# Patient Record
Sex: MALE | Race: WHITE | NOT HISPANIC OR LATINO | Employment: OTHER | URBAN - METROPOLITAN AREA
[De-identification: names, ages, dates, MRNs, and addresses within clinical notes are randomized per-mention and may not be internally consistent; named-entity substitution may affect disease eponyms.]

---

## 2017-01-03 ENCOUNTER — MYC MEDICAL ADVICE (OUTPATIENT)
Dept: INTERNAL MEDICINE | Facility: CLINIC | Age: 72
End: 2017-01-03

## 2017-01-03 DIAGNOSIS — B35.4 TINEA CORPORIS: Primary | ICD-10-CM

## 2017-01-03 RX ORDER — ECONAZOLE NITRATE 10 MG/G
CREAM TOPICAL DAILY
Qty: 30 G | Refills: 1 | Status: SHIPPED | OUTPATIENT
Start: 2017-01-03 | End: 2019-05-20

## 2017-02-17 ENCOUNTER — TRANSFERRED RECORDS (OUTPATIENT)
Dept: HEALTH INFORMATION MANAGEMENT | Facility: CLINIC | Age: 72
End: 2017-02-17

## 2017-04-13 ENCOUNTER — MYC MEDICAL ADVICE (OUTPATIENT)
Dept: INTERNAL MEDICINE | Facility: CLINIC | Age: 72
End: 2017-04-13

## 2017-04-13 ENCOUNTER — MYC REFILL (OUTPATIENT)
Dept: INTERNAL MEDICINE | Facility: CLINIC | Age: 72
End: 2017-04-13

## 2017-04-13 DIAGNOSIS — Z12.5 SCREENING FOR PROSTATE CANCER: ICD-10-CM

## 2017-04-13 DIAGNOSIS — I10 ESSENTIAL HYPERTENSION: Primary | ICD-10-CM

## 2017-04-13 DIAGNOSIS — F41.1 GENERALIZED ANXIETY DISORDER: ICD-10-CM

## 2017-04-13 RX ORDER — CLONAZEPAM 0.5 MG/1
0.25-0.5 TABLET ORAL 2 TIMES DAILY PRN
Qty: 180 TABLET | Refills: 0 | Status: SHIPPED | OUTPATIENT
Start: 2017-04-13 | End: 2017-06-24

## 2017-04-13 NOTE — TELEPHONE ENCOUNTER
Message from [employer]:  Original authorizing provider: MD Russel Marks would like a refill of the following medications:  clonazePAM (KLONOPIN) 0.5 MG tablet [Remberto Peralta MD]    Preferred pharmacy: [facility] [address] [place] [address]    Comment:  Dr Peralta, Please send a refill for my clonazepam to my pharmacy at [facility] in [place]. Thanks Kyree

## 2017-04-13 NOTE — TELEPHONE ENCOUNTER
klonopin      Last Written Prescription Date:  12/14/16  Last Fill Quantity: 180,   # refills: 0  Last Office Visit with [facility] provider: 8/31/16  Future Office visit:       Routing refill request to provider for review/approval because:  Drug not on the [facility] refill protocol or controlled substance

## 2017-04-17 DIAGNOSIS — Z12.5 SCREENING FOR PROSTATE CANCER: ICD-10-CM

## 2017-04-17 DIAGNOSIS — I10 ESSENTIAL HYPERTENSION: ICD-10-CM

## 2017-04-17 LAB
ANION GAP SERPL CALCULATED.3IONS-SCNC: 5 MMOL/L (ref 3–14)
BUN SERPL-MCNC: 17 MG/DL (ref 7–30)
CALCIUM SERPL-MCNC: 8.5 MG/DL (ref 8.5–10.1)
CHLORIDE SERPL-SCNC: 109 MMOL/L (ref 94–109)
CHOLEST SERPL-MCNC: 144 MG/DL
CO2 SERPL-SCNC: 30 MMOL/L (ref 20–32)
CREAT SERPL-MCNC: 1.09 MG/DL (ref 0.66–1.25)
GFR SERPL CREATININE-BSD FRML MDRD: 67 ML/MIN/1.7M2
GLUCOSE SERPL-MCNC: 95 MG/DL (ref 70–99)
HDLC SERPL-MCNC: 47 MG/DL
LDLC SERPL CALC-MCNC: 82 MG/DL
NONHDLC SERPL-MCNC: 97 MG/DL
POTASSIUM SERPL-SCNC: 4.2 MMOL/L (ref 3.4–5.3)
PSA SERPL-ACNC: 5.37 UG/L (ref 0–4)
SODIUM SERPL-SCNC: 144 MMOL/L (ref 133–144)
TRIGL SERPL-MCNC: 76 MG/DL

## 2017-04-17 PROCEDURE — G0103 PSA SCREENING: HCPCS | Performed by: INTERNAL MEDICINE

## 2017-04-17 PROCEDURE — 80061 LIPID PANEL: CPT | Performed by: INTERNAL MEDICINE

## 2017-04-17 PROCEDURE — 36415 COLL VENOUS BLD VENIPUNCTURE: CPT | Performed by: INTERNAL MEDICINE

## 2017-04-17 PROCEDURE — 80048 BASIC METABOLIC PNL TOTAL CA: CPT | Performed by: INTERNAL MEDICINE

## 2017-06-24 DIAGNOSIS — N13.8 HYPERTROPHY OF PROSTATE WITH URINARY OBSTRUCTION: ICD-10-CM

## 2017-06-24 DIAGNOSIS — I10 ESSENTIAL HYPERTENSION: ICD-10-CM

## 2017-06-24 DIAGNOSIS — N40.1 HYPERTROPHY OF PROSTATE WITH URINARY OBSTRUCTION: ICD-10-CM

## 2017-06-24 DIAGNOSIS — F41.1 GENERALIZED ANXIETY DISORDER: ICD-10-CM

## 2017-06-25 NOTE — TELEPHONE ENCOUNTER
tamsulosin (FLOMAX) 0.4 MG 24 hr capsule         Last Written Prescription Date: 06/30/16  Last Fill Quantity: 90, # refills: 3    Last Office Visit with [facility] or [facility] prescribing provider:  08/31/16   Future Office Visit:      BP Readings from Last 3 Encounters:   08/31/16 136/78   05/20/15 136/78   07/16/14 132/68       clonazePAM (KLONOPIN) 0.5 MG tablet      Last Written Prescription Date:  04/13/17  Last Fill Quantity: 180,   # refills: 0  Last Office Visit with [facility] or [facility] prescribing provider: 08/31/16  Future Office visit:       Routing refill request to provider for review/approval because:  Drug not on the [facility] or [facility] refill protocol or controlled substance      mirtazapine (REMERON) 45 MG tablet       Last Written Prescription Date: 06/30/16  Last Fill Quantity: 90; # refills: 3  Last Office Visit with [facility] or [facility] prescribing provider:  08/31/16        Last PHQ-9 score on record= No flowsheet data found.    Lab Results   Component Value Date    AST 29 02/21/2011     Lab Results   Component Value Date    ALT 28 02/21/2011

## 2017-06-27 RX ORDER — MIRTAZAPINE 45 MG/1
TABLET, FILM COATED ORAL
Qty: 90 TABLET | Refills: 0 | Status: SHIPPED | OUTPATIENT
Start: 2017-06-27 | End: 2017-09-13

## 2017-06-27 RX ORDER — CLONAZEPAM 0.5 MG/1
TABLET ORAL
Qty: 180 TABLET | Refills: 0 | Status: SHIPPED
Start: 2017-06-27 | End: 2017-09-13

## 2017-06-27 RX ORDER — TAMSULOSIN HYDROCHLORIDE 0.4 MG/1
CAPSULE ORAL
Qty: 90 CAPSULE | Refills: 0 | Status: SHIPPED | OUTPATIENT
Start: 2017-06-27 | End: 2017-09-13

## 2017-06-27 NOTE — TELEPHONE ENCOUNTER
Prescription approved per [facility] Refill Protocol.    Klonopin- Routing refill request to provider for review/approval because:  Drug not on the [facility] refill protocol

## 2017-06-28 DIAGNOSIS — F41.1 GENERALIZED ANXIETY DISORDER: ICD-10-CM

## 2017-06-28 RX ORDER — CLONAZEPAM 0.5 MG/1
TABLET ORAL
Qty: 180 TABLET | Refills: 0 | OUTPATIENT
Start: 2017-06-28

## 2017-09-13 ENCOUNTER — OFFICE VISIT (OUTPATIENT)
Dept: INTERNAL MEDICINE | Facility: CLINIC | Age: 72
End: 2017-09-13
Payer: COMMERCIAL

## 2017-09-13 VITALS
OXYGEN SATURATION: 99 % | HEART RATE: 54 BPM | BODY MASS INDEX: 27.74 KG/M2 | HEIGHT: 72 IN | DIASTOLIC BLOOD PRESSURE: 80 MMHG | TEMPERATURE: 97.7 F | WEIGHT: 204.8 LBS | SYSTOLIC BLOOD PRESSURE: 128 MMHG

## 2017-09-13 DIAGNOSIS — Z23 NEED FOR PROPHYLACTIC VACCINATION AND INOCULATION AGAINST INFLUENZA: ICD-10-CM

## 2017-09-13 DIAGNOSIS — N40.1 HYPERTROPHY OF PROSTATE WITH URINARY OBSTRUCTION: ICD-10-CM

## 2017-09-13 DIAGNOSIS — F41.1 GENERALIZED ANXIETY DISORDER: ICD-10-CM

## 2017-09-13 DIAGNOSIS — N13.8 HYPERTROPHY OF PROSTATE WITH URINARY OBSTRUCTION: ICD-10-CM

## 2017-09-13 DIAGNOSIS — Z00.00 MEDICARE ANNUAL WELLNESS VISIT, SUBSEQUENT: Primary | ICD-10-CM

## 2017-09-13 DIAGNOSIS — I10 ESSENTIAL HYPERTENSION: ICD-10-CM

## 2017-09-13 PROCEDURE — G0008 ADMIN INFLUENZA VIRUS VAC: HCPCS | Performed by: INTERNAL MEDICINE

## 2017-09-13 PROCEDURE — 90662 IIV NO PRSV INCREASED AG IM: CPT | Performed by: INTERNAL MEDICINE

## 2017-09-13 PROCEDURE — 99207 C PAF COMPLETED  NO CHARGE: CPT | Mod: 25 | Performed by: INTERNAL MEDICINE

## 2017-09-13 PROCEDURE — 99397 PER PM REEVAL EST PAT 65+ YR: CPT | Mod: 25 | Performed by: INTERNAL MEDICINE

## 2017-09-13 RX ORDER — CLONAZEPAM 0.5 MG/1
TABLET ORAL
Qty: 180 TABLET | Refills: 0 | Status: SHIPPED | OUTPATIENT
Start: 2017-09-13 | End: 2018-03-06

## 2017-09-13 RX ORDER — LISINOPRIL 10 MG/1
10 TABLET ORAL DAILY
Qty: 90 TABLET | Refills: 3 | Status: SHIPPED | OUTPATIENT
Start: 2017-09-13 | End: 2018-09-13

## 2017-09-13 RX ORDER — TAMSULOSIN HYDROCHLORIDE 0.4 MG/1
0.4 CAPSULE ORAL DAILY
Qty: 90 CAPSULE | Refills: 3 | Status: SHIPPED | OUTPATIENT
Start: 2017-09-13 | End: 2018-09-13

## 2017-09-13 RX ORDER — MIRTAZAPINE 45 MG/1
45 TABLET, FILM COATED ORAL AT BEDTIME
Qty: 90 TABLET | Refills: 3 | Status: SHIPPED | OUTPATIENT
Start: 2017-09-13 | End: 2018-04-04 | Stop reason: DRUGHIGH

## 2017-09-13 NOTE — PROGRESS NOTES
SUBJECTIVE:   CC: Russel Denny is an 72 year old male who presents for preventative health visit.     Physical   Annual:     Getting at least 3 servings of Calcium per day::  Yes    Bi-annual eye exam::  Yes    Dental care twice a year::  Yes    Sleep apnea or symptoms of sleep apnea::  None    Diet::  Regular (no restrictions)    Frequency of exercise::  6-7 days/week    Duration of exercise::  Greater than 60 minutes    Taking medications regularly::  Yes    Medication side effects::  None    Additional concerns today::  YES  Answers for HPI/ROS submitted by the patient on 9/11/2017   PHQ-2 Score: 0      Colonoscopy done on this date: 2017 (approximately), by this group: [place] , results were Normal .               Today's PHQ-2 Score: PHQ-2 ( 1999 Pfizer) 9/11/2017   Q1: Little interest or pleasure in doing things 0   Q2: Feeling down, depressed or hopeless 0   PHQ-2 Score 0   Q1: Little interest or pleasure in doing things Not at all   Q2: Feeling down, depressed or hopeless Not at all   PHQ-2 Score 0       Abuse: Current or Past(Physical, Sexual or Emotional)- No  Do you feel safe in your environment - Yes    Social History   Substance Use Topics     Smoking status: Former Smoker     Packs/day: 1.00     Years: 18.00     Types: Cigarettes, Cigars     Start date: 1/1/1963     Quit date: 1/1/1981     Smokeless tobacco: Never Used      Comment: Previos 1 ppd smoker     Alcohol use Yes      Comment: Daily coctail     The patient does not drink >3 drinks per day nor >7 drinks per week.    Last PSA:   PSA   Date Value Ref Range Status   04/17/2017 5.37 (H) 0 - 4 ug/L Final     Comment:     Assay Method:  Chemiluminescence using Siemens Vista analyzer       Reviewed orders with patient. Reviewed health maintenance and updated orders accordingly - Yes      Reviewed and updated as needed this visit by clinical staff  Tobacco  Allergies         Reviewed and updated as needed this visit by Provider            Remberto  Richard Peralta MD  [facility]  COGNITIVE SCREEN  1) Repeat 3 items (Banana, Sunrise, Chair)    2) Clock draw: NORMAL  3) 3 item recall: Recalls 3 objects  Results: NORMAL clock, 1-2 items recalled: COGNITIVE IMPAIRMENT LESS LIKELY    Mini-CogTM Copyright S Robin. Licensed by the author for use in [facility]; reprinted with permission (robbi@[email or web address]). All rights reserved.                  Reviewed and updated as needed this visit by clinical staffTobacco  Allergies         Reviewed and updated as needed this visit by Provider        Social History   Substance Use Topics     Smoking status: Former Smoker     Packs/day: 1.00     Years: 18.00     Types: Cigarettes, Cigars     Start date: 1/1/1963     Quit date: 1/1/1981     Smokeless tobacco: Never Used      Comment: Previos 1 ppd smoker     Alcohol use Yes      Comment: Daily coctail       The patient does not drink >3 drinks per day nor >7 drinks per week.    Today's PHQ-2 Score:   PHQ-2 ( 1999 Pfizer) 9/11/2017 8/31/2016   Q1: Little interest or pleasure in doing things 0 0   Q2: Feeling down, depressed or hopeless 0 0   PHQ-2 Score 0 0   Q1: Little interest or pleasure in doing things Not at all -   Q2: Feeling down, depressed or hopeless Not at all -   PHQ-2 Score 0 -       Do you feel safe in your environment - Yes    Do you have a Health Care Directive?: No: Advance care planning was reviewed with patient; patient declined at this time.      Current providers sharing in care for this patient include: Patient Care Team:  Remberto Peralta MD as PCP - General      Hearing impairment: No    Ability to successfully perform activities of daily living: Yes, no assistance needed     Fall risk:         Home safety:  none identified      The following health maintenance items are reviewed in Epic and correct as of today:Health Maintenance   Topic Date Due     AORTIC ANEURYSM SCREENING (SYSTEM ASSIGNED)  06/11/2010     PNEUMOCOCCAL  (2 of 2 - PCV13) 02/21/2012     FALL RISK ASSESSMENT  07/16/2015     ADVANCE DIRECTIVE PLANNING Q5 YRS  05/07/2017     INFLUENZA VACCINE (SYSTEM ASSIGNED)  09/01/2017     LIPID SCREEN Q5 YR MALE (SYSTEM ASSIGNED)  04/17/2022     TETANUS IMMUNIZATION (SYSTEM ASSIGNED)  08/31/2026     COLONOSCOPY Q10 YR  02/17/2027     HEPATITIS C SCREENING  Completed         Pneumonia Vaccine:Adults age 65+ who received Pneumovax (PPSV23) at 65 years or older: Should be given PCV13 > 1 year after their most recent PPSV23    ROS:  Constitutional, HEENT, cardiovascular, pulmonary, GI, , musculoskeletal, neuro, skin, endocrine and psych systems are negative, except as otherwise noted.      OBJECTIVE:   /80 (BP Location: Left arm, Patient Position: Chair, Cuff Size: Adult Regular)  Pulse 54  Temp 97.7  F (36.5  C) (Oral)  Ht 6' (1.829 m)  Wt 204 lb 12.8 oz (92.9 kg)  SpO2 99%  BMI 27.78 kg/m2 Estimated body mass index is 27.78 kg/(m^2) as calculated from the following:    Height as of this encounter: 6' (1.829 m).    Weight as of this encounter: 204 lb 12.8 oz (92.9 kg).  EXAM:   GENERAL: healthy, alert and no distress  NECK: no adenopathy, no asymmetry, masses, or scars and thyroid normal to palpation  RESP: lungs clear to auscultation - no rales, rhonchi or wheezes  CV: regular rate and rhythm, normal S1 S2, no S3 or S4, no murmur, click or rub, no peripheral edema and peripheral pulses strong  ABDOMEN: soft, nontender, no hepatosplenomegaly, no masses and bowel sounds normal  MS: no gross musculoskeletal defects noted, no edema    ASSESSMENT / PLAN:   1. Medicare annual wellness visit, subsequent      2. Need for prophylactic vaccination and inoculation against influenza    - FLU VACCINE, INCREASED ANTIGEN, PRESV FREE, AGE 65+ [81287]  - Vaccine Administration, Initial [42212]    3. Generalized anxiety disorder    - mirtazapine (REMERON) 45 MG tablet; Take 1 tablet (45 mg) by mouth At Bedtime  Dispense: 90 tablet;  Refill: 3  - clonazePAM (KLONOPIN) 0.5 MG tablet; TAKE ONE-HALF TO ONE TABLET BY MOUTH TWICE DAILY AS NEEDED FOR ANXIETY  Dispense: 180 tablet; Refill: 0    4. Hypertrophy of prostate with urinary obstruction    - tamsulosin (FLOMAX) 0.4 MG capsule; Take 1 capsule (0.4 mg) by mouth daily  Dispense: 90 capsule; Refill: 3    5. Essential hypertension    - lisinopril (PRINIVIL/ZESTRIL) 10 MG tablet; Take 1 tablet (10 mg) by mouth daily  Dispense: 90 tablet; Refill: 3  - tamsulosin (FLOMAX) 0.4 MG capsule; Take 1 capsule (0.4 mg) by mouth daily  Dispense: 90 capsule; Refill: 3    End of Life Planning:  Patient currently has an advanced directive: No.  I have verified the patient's ablity to prepare an advanced directive/make health care decisions.  Literature was provided to assist patient in preparing an advanced directive.    COUNSELING:  Reviewed preventive health counseling, as reflected in patient instructions        Estimated body mass index is 27.78 kg/(m^2) as calculated from the following:    Height as of this encounter: 6' (1.829 m).    Weight as of this encounter: 204 lb 12.8 oz (92.9 kg).     reports that he quit smoking about 36 years ago. His smoking use included Cigarettes and Cigars. He started smoking about 54 years ago. He has a 18.00 pack-year smoking history. He has never used smokeless tobacco.        Appropriate preventive services were discussed with this patient, including applicable screening as appropriate for cardiovascular disease, diabetes, osteopenia/osteoporosis, and glaucoma.  As appropriate for age/gender, discussed screening for colorectal cancer, prostate cancer, breast cancer, and cervical cancer. Checklist reviewing preventive services available has been given to the patient.    Reviewed patients plan of care and provided an AVS. The Basic Care Plan (routine screening as documented in Health Maintenance) for Russel meets the Care Plan requirement. This Care Plan has been established  and reviewed with the Patient.    Counseling Resources:  ATP IV Guidelines  Pooled Cohorts Equation Calculator  Breast Cancer Risk Calculator  FRAX Risk Assessment  ICSI Preventive Guidelines  Dietary Guidelines for Americans, 2010  USDA's MyPlate  ASA Prophylaxis  Lung CA Screening    Remberto Peralta MD  [facility]            Injectable Influenza Immunization Documentation    1.  Are you sick today? (Fever of 100.5 or higher on the day of the clinic)   No    2.  Have you ever had Guillain-Stockholm Syndrome within 6 weeks of an influenza vaccionation?  No    3. Do you have a life-threatening allergy to eggs?  No    4. Do you have a life-threatening allergy to a component of the vaccine? May include antibiotics, gelatin or latex.  No     5. Have you ever had a reaction to a dose of flu vaccine that needed immediate medical attention?  No     Form completed by Lizy Roberts MA

## 2017-09-13 NOTE — MR AVS SNAPSHOT
After Visit Summary   9/13/2017    Russel Denny    MRN: [id number]           Patient Information     Date Of Birth          1945        Visit Information        Provider Department      9/13/2017 3:15 PM Remberto Peralta MD [facility]        Today's Diagnoses     Medicare annual wellness visit, subsequent    -  1    Need for prophylactic vaccination and inoculation against influenza        Generalized anxiety disorder        Hypertrophy of prostate with urinary obstruction        Essential hypertension          Care Instructions      Preventive Health Recommendations:   Male Ages 65 and over    Yearly exam:             See your health care provider every year in order to  o   Review health changes.   o   Discuss preventive care.    o   Review your medicines if your doctor has prescribed any.    Talk with your health care provider about whether you should have a test to screen for prostate cancer (PSA).    Every 3 years, have a diabetes test (fasting glucose). If you are at risk for diabetes, you should have this test more often.    Every 5 years, have a cholesterol test. Have this test more often if you are at risk for high cholesterol or heart disease.     Every 10 years, have a colonoscopy. Or, have a yearly FIT test (stool test). These exams will check for colon cancer.    Talk to with your health care provider about screening for Abdominal Aortic Aneurysm if you have a family history of AAA or have a history of smoking.    Shots:     Get a flu shot each year.     Get a tetanus shot every 10 years.     Talk to your doctor about your pneumonia vaccines. There are now two you should receive - Pneumovax (PPSV 23) and Prevnar (PCV 13).     Talk to your doctor about a shingles vaccine.     Talk to your doctor about the hepatitis B vaccine.  Nutrition:     Eat at least 5 servings of fruits and vegetables each day.     Eat whole-grain bread, whole-wheat pasta and brown  rice instead of white grains and rice.     Talk to your provider about Calcium and Vitamin D.   Lifestyle    Exercise for at least 150 minutes a week (30 minutes a day, 5 days a week). This will help you control your weight and prevent disease.     Limit alcohol to one drink per day.     No smoking.     Wear sunscreen to prevent skin cancer.     See your dentist every six months for an exam and cleaning.     See your eye doctor every 1 to 2 years to screen for conditions such as glaucoma, macular degeneration, cataracts, etc           Follow-ups after your visit        Who to contact     If you have questions or need follow up information about today's clinic visit or your schedule please contact [facility] directly at [phone].  Normal or non-critical lab and imaging results will be communicated to you by [employer], letter or phone within 4 business days after the clinic has received the results. If you do not hear from us within 7 days, please contact the clinic through [employer] or phone. If you have a critical or abnormal lab result, we will notify you by phone as soon as possible.  Submit refill requests through [employer] or call your pharmacy and they will forward the refill request to us. Please allow 3 business days for your refill to be completed.          Additional Information About Your Visit        [employer] Information     [employer] gives you secure access to your electronic health record. If you see a primary care provider, you can also send messages to your care team and make appointments. If you have questions, please call your primary care clinic.  If you do not have a primary care provider, please call [phone] and they will assist you.        Care EveryWhere ID     This is your Care EveryWhere ID. This could be used by other organizations to access your [facility] medical records  [id number]        Your Vitals Were     Pulse Temperature Height Pulse Oximetry BMI (Body Mass  Index)       54 97.7  F (36.5  C) (Oral) 6' (1.829 m) 99% 27.78 kg/m2        Blood Pressure from Last 3 Encounters:   09/13/17 128/80   08/31/16 136/78   05/20/15 136/78    Weight from Last 3 Encounters:   09/13/17 204 lb 12.8 oz (92.9 kg)   08/31/16 209 lb (94.8 kg)   05/20/15 205 lb 3.2 oz (93.1 kg)              We Performed the Following     FLU VACCINE, INCREASED ANTIGEN, PRESV FREE, AGE 65+ [98867]     PAF COMPLETED     Vaccine Administration, Initial [87265]          Today's Medication Changes          These changes are accurate as of: 9/13/17  5:00 PM.  If you have any questions, ask your nurse or doctor.               These medicines have changed or have updated prescriptions.        Dose/Directions    clonazePAM 0.5 MG tablet   Commonly known as:  klonoPIN   This may have changed:  See the new instructions.   Used for:  Generalized anxiety disorder   Changed by:  Remberto Peralta MD        TAKE ONE-HALF TO ONE TABLET BY MOUTH TWICE DAILY AS NEEDED FOR ANXIETY   Quantity:  180 tablet   Refills:  0       mirtazapine 45 MG tablet   Commonly known as:  REMERON   This may have changed:  See the new instructions.   Used for:  Generalized anxiety disorder   Changed by:  Remberto Peralta MD        Dose:  45 mg   Take 1 tablet (45 mg) by mouth At Bedtime   Quantity:  90 tablet   Refills:  3       tamsulosin 0.4 MG capsule   Commonly known as:  FLOMAX   This may have changed:  See the new instructions.   Used for:  Hypertrophy of prostate with urinary obstruction, Essential hypertension   Changed by:  Remberto Peralta MD        Dose:  0.4 mg   Take 1 capsule (0.4 mg) by mouth daily   Quantity:  90 capsule   Refills:  3            Where to get your medicines      These medications were sent to [facility] [address] [place] [address] [address] [address]     Phone:  [phone]     lisinopril 10 MG tablet    mirtazapine 45 MG tablet    tamsulosin 0.4 MG  capsule         Some of these will need a paper prescription and others can be bought over the counter.  Ask your nurse if you have questions.     Bring a paper prescription for each of these medications     clonazePAM 0.5 MG tablet                Primary Care Provider Office Phone # Fax #    Remberto Peralta [phone]       [address] [place] [address]        Equal Access to Services     JACOBY KENYON : Hadii aad ku hadasho Soomaali, waaxda luqadaha, qaybta kaalmada adeegyada, waxay idiin hayaan adeeg kharash la'aan ah. So [facility] [phone].    ATENCIÓN: Si habla español, tiene a spicer disposición servicios gratuitos de asistencia lingüística. Zhouame al [phone].    We comply with applicable federal civil rights laws and [place] laws. We do not discriminate on the basis of race, color, national origin, age, disability sex, sexual orientation or gender identity.            Thank you!     Thank you for choosing [facility]  for your care. Our goal is always to provide you with excellent care. Hearing back from our patients is one way we can continue to improve our services. Please take a few minutes to complete the written survey that you may receive in the mail after your visit with us. Thank you!             Your Updated Medication List - Protect others around you: Learn how to safely use, store and throw away your medicines at www.disposemymeds.org.          This list is accurate as of: 9/13/17  5:00 PM.  Always use your most recent med list.                   Brand Name Dispense Instructions for use Diagnosis    aspirin 81 MG tablet     100    1 tab po QD (Once per day)    Routine general medical examination at a health care facility       clonazePAM 0.5 MG tablet    klonoPIN    180 tablet    TAKE ONE-HALF TO ONE TABLET BY MOUTH TWICE DAILY AS NEEDED FOR ANXIETY    Generalized anxiety disorder       * econazole nitrate 1 % cream     30 g    Apply topically  daily    Tinea corporis       * econazole nitrate 1 % cream     30 g    Apply topically daily    Tinea corporis       EPI E-Z PEN MICHEL 1:1000  IJ     1    1 TIME ONLY    Toxic eff fish/shellfish       lisinopril 10 MG tablet    PRINIVIL/ZESTRIL    90 tablet    Take 1 tablet (10 mg) by mouth daily    Essential hypertension       mirtazapine 45 MG tablet    REMERON    90 tablet    Take 1 tablet (45 mg) by mouth At Bedtime    Generalized anxiety disorder       multivitamin Tabs per tablet     30 tablet    Take 1 tablet by mouth daily        RANITIDINE 75 75 MG tablet   Generic drug:  ranitidine      1 TABLET 1 TO 2 TIMES A DAY AS NEEDED        tamsulosin 0.4 MG capsule    FLOMAX    90 capsule    Take 1 capsule (0.4 mg) by mouth daily    Hypertrophy of prostate with urinary obstruction, Essential hypertension       * Notice:  This list has 2 medication(s) that are the same as other medications prescribed for you. Read the directions carefully, and ask your doctor or other care provider to review them with you.

## 2017-09-13 NOTE — PROGRESS NOTES
Answers for HPI/ROS submitted by the patient on 9/11/2017   Annual Exam:  Getting at least 3 servings of Calcium per day:: Yes  Bi-annual eye exam:: Yes  Dental care twice a year:: Yes  Sleep apnea or symptoms of sleep apnea:: None  Diet:: Regular (no restrictions)  Frequency of exercise:: 6-7 days/week  Taking medications regularly:: Yes  Medication side effects:: None  Additional concerns today:: YES  PHQ-2 Score: 0  Duration of exercise:: Greater than 60 minutes

## 2017-09-13 NOTE — NURSING NOTE
Chief Complaint   Patient presents with     Physical       Initial /80 (BP Location: Left arm, Patient Position: Chair, Cuff Size: Adult Regular)  Pulse 54  Temp 97.7  F (36.5  C) (Oral)  Ht 6' (1.829 m)  Wt 204 lb 12.8 oz (92.9 kg)  SpO2 99%  BMI 27.78 kg/m2 Estimated body mass index is 27.78 kg/(m^2) as calculated from the following:    Height as of this encounter: 6' (1.829 m).    Weight as of this encounter: 204 lb 12.8 oz (92.9 kg).  Medication Reconciliation: complete

## 2018-03-06 DIAGNOSIS — F41.1 GENERALIZED ANXIETY DISORDER: ICD-10-CM

## 2018-03-06 DIAGNOSIS — N40.1 HYPERTROPHY OF PROSTATE WITH URINARY OBSTRUCTION: ICD-10-CM

## 2018-03-06 DIAGNOSIS — N13.8 HYPERTROPHY OF PROSTATE WITH URINARY OBSTRUCTION: ICD-10-CM

## 2018-03-06 DIAGNOSIS — I10 ESSENTIAL HYPERTENSION: ICD-10-CM

## 2018-03-06 NOTE — TELEPHONE ENCOUNTER
clonazePAM (KLONOPIN) 0.5 MG tablet      Last Written Prescription Date:  9/13/2017  Last Fill Quantity: 180,   # refills: 0  Last Office Visit: 9/13/2017  Future Office visit:       Routing refill request to provider for review/approval because:  Drug not on the [facility], [facility] or [facility] refill protocol or controlled substance

## 2018-03-07 RX ORDER — MIRTAZAPINE 45 MG/1
TABLET, FILM COATED ORAL
Qty: 90 TABLET | Refills: 3 | OUTPATIENT
Start: 2018-03-07

## 2018-03-07 RX ORDER — TAMSULOSIN HYDROCHLORIDE 0.4 MG/1
CAPSULE ORAL
Qty: 90 CAPSULE | Refills: 3 | OUTPATIENT
Start: 2018-03-07

## 2018-03-07 RX ORDER — CLONAZEPAM 0.5 MG/1
TABLET ORAL
Qty: 180 TABLET | Refills: 0 | Status: SHIPPED | OUTPATIENT
Start: 2018-03-07 | End: 2018-06-18

## 2018-03-18 ENCOUNTER — MYC MEDICAL ADVICE (OUTPATIENT)
Dept: INTERNAL MEDICINE | Facility: CLINIC | Age: 73
End: 2018-03-18

## 2018-03-18 DIAGNOSIS — I10 ESSENTIAL HYPERTENSION: Primary | ICD-10-CM

## 2018-03-18 DIAGNOSIS — Z12.5 SCREENING FOR PROSTATE CANCER: ICD-10-CM

## 2018-03-19 ENCOUNTER — MYC MEDICAL ADVICE (OUTPATIENT)
Dept: INTERNAL MEDICINE | Facility: CLINIC | Age: 73
End: 2018-03-19

## 2018-03-20 DIAGNOSIS — Z12.5 SCREENING FOR PROSTATE CANCER: ICD-10-CM

## 2018-03-20 DIAGNOSIS — I10 ESSENTIAL HYPERTENSION: ICD-10-CM

## 2018-03-20 LAB
ANION GAP SERPL CALCULATED.3IONS-SCNC: 4 MMOL/L (ref 3–14)
BUN SERPL-MCNC: 21 MG/DL (ref 7–30)
CALCIUM SERPL-MCNC: 8.8 MG/DL (ref 8.5–10.1)
CHLORIDE SERPL-SCNC: 107 MMOL/L (ref 94–109)
CO2 SERPL-SCNC: 29 MMOL/L (ref 20–32)
CREAT SERPL-MCNC: 1.14 MG/DL (ref 0.66–1.25)
GFR SERPL CREATININE-BSD FRML MDRD: 63 ML/MIN/1.7M2
GLUCOSE SERPL-MCNC: 96 MG/DL (ref 70–99)
POTASSIUM SERPL-SCNC: 4.6 MMOL/L (ref 3.4–5.3)
PSA SERPL-ACNC: 9.89 UG/L (ref 0–4)
SODIUM SERPL-SCNC: 140 MMOL/L (ref 133–144)

## 2018-03-20 PROCEDURE — 80048 BASIC METABOLIC PNL TOTAL CA: CPT | Performed by: INTERNAL MEDICINE

## 2018-03-20 PROCEDURE — G0103 PSA SCREENING: HCPCS | Performed by: INTERNAL MEDICINE

## 2018-03-20 PROCEDURE — 36415 COLL VENOUS BLD VENIPUNCTURE: CPT | Performed by: INTERNAL MEDICINE

## 2018-04-04 ENCOUNTER — OFFICE VISIT (OUTPATIENT)
Dept: INTERNAL MEDICINE | Facility: CLINIC | Age: 73
End: 2018-04-04
Payer: COMMERCIAL

## 2018-04-04 VITALS
BODY MASS INDEX: 27.3 KG/M2 | TEMPERATURE: 98.2 F | HEIGHT: 72 IN | WEIGHT: 201.6 LBS | HEART RATE: 60 BPM | SYSTOLIC BLOOD PRESSURE: 138 MMHG | RESPIRATION RATE: 16 BRPM | DIASTOLIC BLOOD PRESSURE: 92 MMHG

## 2018-04-04 DIAGNOSIS — F41.1 ANXIETY STATE: Primary | ICD-10-CM

## 2018-04-04 DIAGNOSIS — R68.2 DRY MOUTH: ICD-10-CM

## 2018-04-04 DIAGNOSIS — R97.20 ELEVATED PROSTATE SPECIFIC ANTIGEN (PSA): ICD-10-CM

## 2018-04-04 PROCEDURE — 86235 NUCLEAR ANTIGEN ANTIBODY: CPT | Performed by: INTERNAL MEDICINE

## 2018-04-04 PROCEDURE — 36415 COLL VENOUS BLD VENIPUNCTURE: CPT | Performed by: INTERNAL MEDICINE

## 2018-04-04 PROCEDURE — 99214 OFFICE O/P EST MOD 30 MIN: CPT | Performed by: INTERNAL MEDICINE

## 2018-04-04 RX ORDER — MIRTAZAPINE 15 MG/1
TABLET, FILM COATED ORAL
Qty: 49 TABLET | Refills: 0 | Status: SHIPPED | OUTPATIENT
Start: 2018-04-04 | End: 2018-09-20

## 2018-04-04 ASSESSMENT — PAIN SCALES - GENERAL: PAINLEVEL: NO PAIN (0)

## 2018-04-04 NOTE — PROGRESS NOTES
SUBJECTIVE:   Russel Denny is a 72 year old male who presents to clinic today for the following health issues:      Pt states he has been having some dry mouth x1 year. He also would like to discuss his PSA results.         Problem list and histories reviewed & adjusted, as indicated.  Additional history:     Patient continues on high-dose mirtazapine for anxiety, he is unclear as to whether this medication is offered definite benefit.    Latest PSA up to over 9.  He has previously followed with a urologist at [place], where they have engaged in watchful waiting regarding his elevated PSA.  Last April it was 5.37.    Reviewed and updated as needed this visit by clinical staff  Tobacco  Allergies  Meds  Problems       Reviewed and updated as needed this visit by Provider  Allergies  Meds  Problems         ROS:  Constitutional, HEENT, cardiovascular, pulmonary, GI, , musculoskeletal, neuro, skin, endocrine and psych systems are negative, except as otherwise noted.    OBJECTIVE:     BP (!) 138/92 (BP Location: Left arm, Patient Position: Chair, Cuff Size: Adult Regular)  Pulse 60  Temp 98.2  F (36.8  C) (Oral)  Resp 16  Ht 6' (1.829 m)  Wt 201 lb 9.6 oz (91.4 kg)  BMI 27.34 kg/m2  Body mass index is 27.34 kg/(m^2).  GENERAL: healthy, alert and no distress  NECK: no adenopathy, no asymmetry, masses, or scars and thyroid normal to palpation  RESP: lungs clear to auscultation - no rales, rhonchi or wheezes  CV: regular rate and rhythm, normal S1 S2, no S3 or S4, no murmur, click or rub, no peripheral edema and peripheral pulses strong  ABDOMEN: soft, nontender, no hepatosplenomegaly, no masses and bowel sounds normal  MS: no gross musculoskeletal defects noted, no edema        ASSESSMENT/PLAN:     1. Anxiety state  Suspect that dry mouth may be possible side effect of mirtazapine.  Given this, and questionable benefit of medication from patient's perspective, will taper the medication off.  - mirtazapine  (REMERON) 15 MG tablet; 2 tabs nightly x 14 days, then 1 tab nightly x 14 days, then 1 tab every other night for 14 days, then stop  Dispense: 49 tablet; Refill: 0    2. Dry mouth  Taper off mirtazapine as above, check anti-Ro and La antibodies as ordered  - SSA Ro EVERARDO Antibody IgG  - SSB La EVERARDO Antibody IgG    3. Elevated prostate specific antigen (PSA)  Recheck PSA in 3 months.  Follow-up with urology at that time if still elevated.  - Prostate spec antigen screen; Future        Remberto Peralta MD  [facility]

## 2018-04-04 NOTE — MR AVS SNAPSHOT
After Visit Summary   4/4/2018    Russel Denny    MRN: [id number]           Patient Information     Date Of Birth          1945        Visit Information        Provider Department      4/4/2018 2:30 PM Remberto Peralta MD [facility]        Today's Diagnoses     Anxiety state    -  1    Dry mouth        Elevated prostate specific antigen (PSA)           Follow-ups after your visit        Future tests that were ordered for you today     Open Future Orders        Priority Expected Expires Ordered    Prostate spec antigen screen Routine 7/3/2018 8/2/2018 4/4/2018            Who to contact     If you have questions or need follow up information about today's clinic visit or your schedule please contact [facility] directly at [phone].  Normal or non-critical lab and imaging results will be communicated to you by MyChart, letter or phone within 4 business days after the clinic has received the results. If you do not hear from us within 7 days, please contact the clinic through MyChart or phone. If you have a critical or abnormal lab result, we will notify you by phone as soon as possible.  Submit refill requests through [employer] or call your pharmacy and they will forward the refill request to us. Please allow 3 business days for your refill to be completed.          Additional Information About Your Visit        MyChart Information     [employer] gives you secure access to your electronic health record. If you see a primary care provider, you can also send messages to your care team and make appointments. If you have questions, please call your primary care clinic.  If you do not have a primary care provider, please call [phone] and they will assist you.        Care EveryWhere ID     This is your Care EveryWhere ID. This could be used by other organizations to access your [facility] medical records  [id number]        Your Vitals Were      Pulse Temperature Respirations Height BMI (Body Mass Index)       60 98.2  F (36.8  C) (Oral) 16 6' (1.829 m) 27.34 kg/m2        Blood Pressure from Last 3 Encounters:   04/04/18 (!) 138/92   09/13/17 128/80   08/31/16 136/78    Weight from Last 3 Encounters:   04/04/18 201 lb 9.6 oz (91.4 kg)   09/13/17 204 lb 12.8 oz (92.9 kg)   08/31/16 209 lb (94.8 kg)              We Performed the Following     SSA Ro EVERARDO Antibody IgG     SSB La EVERARDO Antibody IgG          Today's Medication Changes          These changes are accurate as of 4/4/18  3:50 PM.  If you have any questions, ask your nurse or doctor.               These medicines have changed or have updated prescriptions.        Dose/Directions    mirtazapine 15 MG tablet   Commonly known as:  REMERON   This may have changed:    - medication strength  - how much to take  - how to take this  - when to take this  - additional instructions   Used for:  Anxiety state   Changed by:  Remberto Peralta MD        2 tabs nightly x 14 days, then 1 tab nightly x 14 days, then 1 tab every other night for 14 days, then stop   Quantity:  49 tablet   Refills:  0            Where to get your medicines      These medications were sent to [facility] [address] [address]     Phone:  [phone]     mirtazapine 15 MG tablet                Primary Care Provider Office Phone # Fax #    Remberto Peralta [phone]       [address] [place] [address]        Equal Access to Services     JACOBY KENYON : Dia Mejía, wanegroda luqdavid, qaybta kaalsonu chino. So [facility] [phone].    ATENCIÓN: Si habla español, tiene a spicer disposición servicios gratuitos de asistencia lingüística. Samara al [phone].    We comply with applicable federal civil rights laws and [place] laws. We do not discriminate on the basis of race, color,  national origin, age, disability, sex, sexual orientation, or gender identity.            Thank you!     Thank you for choosing [facility]  for your care. Our goal is always to provide you with excellent care. Hearing back from our patients is one way we can continue to improve our services. Please take a few minutes to complete the written survey that you may receive in the mail after your visit with us. Thank you!             Your Updated Medication List - Protect others around you: Learn how to safely use, store and throw away your medicines at www.disposemymeds.org.          This list is accurate as of 4/4/18  3:50 PM.  Always use your most recent med list.                   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)       lisinopril 10 MG tablet    PRINIVIL/ZESTRIL    90 tablet    Take 1 tablet (10 mg) by mouth daily    Essential hypertension       mirtazapine 15 MG tablet    REMERON    49 tablet    2 tabs nightly x 14 days, then 1 tab nightly x 14 days, then 1 tab every other night for 14 days, then stop    Anxiety state       multivitamin Tabs per tablet     30 tablet    Take 1 tablet by mouth daily        RANITIDINE 75 75 MG tablet   Generic drug:  ranitidine      1 TABLET 1 TO 2 TIMES A DAY AS NEEDED        tamsulosin 0.4 MG capsule    FLOMAX    90 capsule    Take 1 capsule (0.4 mg) by mouth daily    Hypertrophy of  prostate with urinary obstruction, Essential hypertension       * Notice:  This list has 2 medication(s) that are the same as other medications prescribed for you. Read the directions carefully, and ask your doctor or other care provider to review them with you.

## 2018-04-05 LAB
ENA SS-A IGG SER IA-ACNC: 0.2 AI (ref 0–0.9)
ENA SS-B IGG SER IA-ACNC: <0.2 AI (ref 0–0.9)

## 2018-04-26 ENCOUNTER — MYC MEDICAL ADVICE (OUTPATIENT)
Dept: INTERNAL MEDICINE | Facility: CLINIC | Age: 73
End: 2018-04-26

## 2018-06-18 DIAGNOSIS — F41.1 GENERALIZED ANXIETY DISORDER: ICD-10-CM

## 2018-06-20 RX ORDER — CLONAZEPAM 0.5 MG/1
TABLET ORAL
Qty: 180 TABLET | Refills: 0 | Status: SHIPPED | OUTPATIENT
Start: 2018-06-20 | End: 2024-04-11

## 2018-07-17 ENCOUNTER — TRANSFERRED RECORDS (OUTPATIENT)
Dept: HEALTH INFORMATION MANAGEMENT | Facility: CLINIC | Age: 73
End: 2018-07-17

## 2018-08-16 ENCOUNTER — TELEPHONE (OUTPATIENT)
Dept: INTERNAL MEDICINE | Facility: CLINIC | Age: 73
End: 2018-08-16

## 2018-08-16 NOTE — TELEPHONE ENCOUNTER
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    Type of outreach:    Sent letter.    Questions for provider review:    None                                                                                                                                    Nandini Peralta

## 2018-08-16 NOTE — LETTER
[facility]  [address] [address]  [phone]  August 16, 2018    Russel Denyn  [address] [place] [address]    Dear Russel,    We care about your health and based on a review of your medical records, recommend the the following, to better manage your health:      You are in particular need of attention regarding:  -High Blood Pressure    I am recommending that you:     -schedule a FOLLOWUP OFFICE APPOINTMENT with me.         Here is a list of Health Maintenance topics that are due now or due soon:  Health Maintenance Due   Topic Date Due     AORTIC ANEURYSM SCREENING (SYSTEM ASSIGNED)  06/11/2010     Pneumococcal Vaccine (2 of 2 - PCV13) 02/21/2012     FALL RISK ASSESSMENT  07/16/2015     Discuss Advance Directive Planning  05/07/2017     Depression Assessment 2 - yearly  09/13/2018       Please call us at [phone] or [phone] (or use [employer]) to address the above recommendations.     Thank you for trusting [facility].  We appreciate the opportunity to serve you and look forward to supporting your healthcare needs in the future.    If you have (or plan to have) any of these tests done at a facility other than a [facility] or a [facility], please have the results from these tests sent to your primary physician at [facility].    Healthy Regards,    Richard Peralta MD/Nandini Peralta [facility]

## 2018-08-21 ENCOUNTER — MYC MEDICAL ADVICE (OUTPATIENT)
Dept: INTERNAL MEDICINE | Facility: CLINIC | Age: 73
End: 2018-08-21

## 2018-08-21 DIAGNOSIS — R68.2 DRY MOUTH: Primary | ICD-10-CM

## 2018-09-13 DIAGNOSIS — N40.1 HYPERTROPHY OF PROSTATE WITH URINARY OBSTRUCTION: ICD-10-CM

## 2018-09-13 DIAGNOSIS — I10 ESSENTIAL HYPERTENSION: ICD-10-CM

## 2018-09-13 DIAGNOSIS — N13.8 HYPERTROPHY OF PROSTATE WITH URINARY OBSTRUCTION: ICD-10-CM

## 2018-09-13 RX ORDER — TAMSULOSIN HYDROCHLORIDE 0.4 MG/1
CAPSULE ORAL
Qty: 90 CAPSULE | Refills: 0 | Status: SHIPPED | OUTPATIENT
Start: 2018-09-13 | End: 2018-09-20

## 2018-09-13 RX ORDER — LISINOPRIL 10 MG/1
TABLET ORAL
Qty: 90 TABLET | Refills: 0 | Status: SHIPPED | OUTPATIENT
Start: 2018-09-13 | End: 2018-12-04

## 2018-09-13 NOTE — TELEPHONE ENCOUNTER
"Requested Prescriptions   Pending Prescriptions Disp Refills     lisinopril (PRINIVIL/ZESTRIL) 10 MG tablet [Pharmacy Med Name: LISINOPRIL 10 MG TABLET] 90 tablet 0     Sig: TAKE 1 TABLET BY MOUTH EVERY DAY    ACE Inhibitors (Including Combos) Protocol Failed    9/13/2018  1:13 AM       Failed - Blood pressure under 140/90 in past 12 months    BP Readings from Last 3 Encounters:   04/04/18 (!) 138/92   09/13/17 128/80   08/31/16 136/78                Passed - Recent (12 mo) or future (30 days) visit within the authorizing provider's specialty    Patient had office visit in the last 12 months or has a visit in the next 30 days with authorizing provider or within the authorizing provider's specialty.  See \"Patient Info\" tab in inbasket, or \"Choose Columns\" in Meds & Orders section of the refill encounter.           Passed - Patient is age 18 or older       Passed - Normal serum creatinine on file in past 12 months    Recent Labs   Lab Test  03/20/18   0944   CR  1.14            Passed - Normal serum potassium on file in past 12 months    Recent Labs   Lab Test  03/20/18   0944   POTASSIUM  4.6             tamsulosin (FLOMAX) 0.4 MG capsule [Pharmacy Med Name: TAMSULOSIN HCL 0.4 MG CAPSULE] 90 capsule 0     Sig: TAKE 1 CAPSULE BY MOUTH EVERY DAY.    Alpha Blockers Failed    9/13/2018  1:13 AM       Failed - Blood pressure under 140/90 in past 12 months    BP Readings from Last 3 Encounters:   04/04/18 (!) 138/92   09/13/17 128/80   08/31/16 136/78                Passed - Recent (12 mo) or future (30 days) visit within the authorizing provider's specialty    Patient had office visit in the last 12 months or has a visit in the next 30 days with authorizing provider or within the authorizing provider's specialty.  See \"Patient Info\" tab in inbasket, or \"Choose Columns\" in Meds & Orders section of the refill encounter.           Passed - Patient does not have Tadalafil, Vardenafil, or Sildenafil on their medication list    "    Passed - Patient is 18 years of age or older        Routing refill request to provider for review/approval because:   out of range:  blood pressure

## 2018-09-20 ENCOUNTER — MYC MEDICAL ADVICE (OUTPATIENT)
Dept: INTERNAL MEDICINE | Facility: CLINIC | Age: 73
End: 2018-09-20

## 2018-09-20 DIAGNOSIS — I10 ESSENTIAL HYPERTENSION: ICD-10-CM

## 2018-09-20 DIAGNOSIS — N40.1 HYPERTROPHY OF PROSTATE WITH URINARY OBSTRUCTION: ICD-10-CM

## 2018-09-20 DIAGNOSIS — F41.1 GENERALIZED ANXIETY DISORDER: ICD-10-CM

## 2018-09-20 DIAGNOSIS — N13.8 HYPERTROPHY OF PROSTATE WITH URINARY OBSTRUCTION: ICD-10-CM

## 2018-09-20 RX ORDER — TAMSULOSIN HYDROCHLORIDE 0.4 MG/1
0.8 CAPSULE ORAL DAILY
Qty: 90 CAPSULE | Refills: 0 | COMMUNITY
Start: 2018-09-20 | End: 2019-02-06

## 2018-09-20 RX ORDER — MIRTAZAPINE 45 MG/1
45 TABLET, FILM COATED ORAL AT BEDTIME
Qty: 90 TABLET | Refills: 3 | COMMUNITY
Start: 2018-09-20 | End: 2020-09-09

## 2018-12-04 DIAGNOSIS — I10 ESSENTIAL HYPERTENSION: ICD-10-CM

## 2018-12-04 RX ORDER — LISINOPRIL 10 MG/1
TABLET ORAL
Qty: 90 TABLET | Refills: 3 | Status: SHIPPED | OUTPATIENT
Start: 2018-12-04 | End: 2019-12-06

## 2018-12-04 NOTE — TELEPHONE ENCOUNTER
"Requested Prescriptions   Pending Prescriptions Disp Refills     lisinopril (PRINIVIL/ZESTRIL) 10 MG tablet [Pharmacy Med Name: LISINOPRIL 10 MG TABLET] 90 tablet 0     Sig: TAKE 1 TABLET BY MOUTH EVERY DAY    ACE Inhibitors (Including Combos) Protocol Failed    12/4/2018  8:35 AM       Failed - Blood pressure under 140/90 in past 12 months    BP Readings from Last 3 Encounters:   04/04/18 (!) 138/92   09/13/17 128/80   08/31/16 136/78                Passed - Recent (12 mo) or future (30 days) visit within the authorizing provider's specialty    Patient had office visit in the last 12 months or has a visit in the next 30 days with authorizing provider or within the authorizing provider's specialty.  See \"Patient Info\" tab in inbasket, or \"Choose Columns\" in Meds & Orders section of the refill encounter.             Passed - Patient is age 18 or older       Passed - Normal serum creatinine on file in past 12 months    Recent Labs   Lab Test  03/20/18   0944   CR  1.14            Passed - Normal serum potassium on file in past 12 months    Recent Labs   Lab Test  03/20/18   0944   POTASSIUM  4.6             Last Written Prescription Date:  9/13/2018  Last Fill Quantity: 90,  # refills: 0   Last office visit: 4/4/2018 with prescribing provider:  4/04/2018   Future Office Visit:      "

## 2019-02-06 ENCOUNTER — OFFICE VISIT (OUTPATIENT)
Dept: INTERNAL MEDICINE | Facility: CLINIC | Age: 74
End: 2019-02-06
Payer: COMMERCIAL

## 2019-02-06 VITALS
OXYGEN SATURATION: 98 % | HEART RATE: 53 BPM | BODY MASS INDEX: 26.45 KG/M2 | RESPIRATION RATE: 16 BRPM | TEMPERATURE: 97.3 F | SYSTOLIC BLOOD PRESSURE: 130 MMHG | WEIGHT: 195 LBS | DIASTOLIC BLOOD PRESSURE: 70 MMHG

## 2019-02-06 DIAGNOSIS — I10 ESSENTIAL HYPERTENSION: ICD-10-CM

## 2019-02-06 DIAGNOSIS — N13.8 HYPERTROPHY OF PROSTATE WITH URINARY OBSTRUCTION: ICD-10-CM

## 2019-02-06 DIAGNOSIS — Z12.5 SCREENING FOR PROSTATE CANCER: ICD-10-CM

## 2019-02-06 DIAGNOSIS — N40.1 HYPERTROPHY OF PROSTATE WITH URINARY OBSTRUCTION: ICD-10-CM

## 2019-02-06 DIAGNOSIS — Z00.00 MEDICARE ANNUAL WELLNESS VISIT, SUBSEQUENT: Primary | ICD-10-CM

## 2019-02-06 DIAGNOSIS — R97.20 ELEVATED PROSTATE SPECIFIC ANTIGEN (PSA): ICD-10-CM

## 2019-02-06 PROCEDURE — 99397 PER PM REEVAL EST PAT 65+ YR: CPT | Performed by: INTERNAL MEDICINE

## 2019-02-06 PROCEDURE — 84153 ASSAY OF PSA TOTAL: CPT | Performed by: INTERNAL MEDICINE

## 2019-02-06 PROCEDURE — 36415 COLL VENOUS BLD VENIPUNCTURE: CPT | Performed by: INTERNAL MEDICINE

## 2019-02-06 PROCEDURE — 80061 LIPID PANEL: CPT | Performed by: INTERNAL MEDICINE

## 2019-02-06 PROCEDURE — 80048 BASIC METABOLIC PNL TOTAL CA: CPT | Performed by: INTERNAL MEDICINE

## 2019-02-06 RX ORDER — TAMSULOSIN HYDROCHLORIDE 0.4 MG/1
0.8 CAPSULE ORAL DAILY
Qty: 180 CAPSULE | Refills: 3 | Status: SHIPPED | OUTPATIENT
Start: 2019-02-06 | End: 2019-05-23

## 2019-02-06 ASSESSMENT — ACTIVITIES OF DAILY LIVING (ADL): CURRENT_FUNCTION: NO ASSISTANCE NEEDED

## 2019-02-06 NOTE — PROGRESS NOTES
"SUBJECTIVE:   Russel Denny is a 73 year old male who presents for Preventive Visit.      Are you in the first 12 months of your Medicare coverage?  No    Annual Wellness Visit     In general, how would you rate your overall health?  Excellent    Frequency of exercise:  6-7 days/week    Duration of exercise:  30-45 minutes    Do you usually eat at least 4 servings of fruit and vegetables a day, include whole grains    & fiber and avoid regularly eating high fat or \"junk\" foods?  Yes    Taking medications regularly:  Yes    Medication side effects:  None    Ability to successfully perform activities of daily living:  No assistance needed    Home Safety:  Throw rugs in the hallway and no safety concerns identified    Hearing Impairment:  Difficulty following a conversation in a noisy restaurant or crowded room, feel that people are mumbling or not speaking clearly, difficulty following dialogue in the theater, difficult to understand a speaker at a public meeting or Mandaeism service, need to ask people to speak up or repeat themselves, find that men's voices are easier to understand than woman's, difficulty understanding soft or whispered speech and difficulty understanding speech on the telephone    In the past 6 months, have you been bothered by leaking of urine? Yes    In general, how would you rate your overall mental or emotional health?  Excellent    PHQ-2 Total Score: 0    Additional concerns today:  No    Do you feel safe in your environment? Yes    Do you have a Health Care Directive? Yes: Patient states has Advance Directive and will bring in a copy to clinic.      Fall risk  Fallen 2 or more times in the past year?: No  Any fall with injury in the past year?: No    Cognitive Screening   1) Repeat 3 items (Leader, Season, Table)    2) Clock draw: NORMAL  3) 3 item recall: Recalls 3 objects  Results: 3 items recalled: COGNITIVE IMPAIRMENT LESS LIKELY    Mini-CogTM Copyright S Robin. Licensed by the " author for use in [facility]; reprinted with permission (robbi@[facility]). All rights reserved.      Do you have sleep apnea, excessive snoring or daytime drowsiness?: no    Reviewed and updated as needed this visit by clinical staff  Tobacco  Allergies  Meds  Problems  Med Hx  Surg Hx  Fam Hx         Reviewed and updated as needed this visit by Provider  Tobacco  Allergies  Meds  Problems  Med Hx  Surg Hx  Fam Hx        Social History     Tobacco Use     Smoking status: Former Smoker     Packs/day: 1.00     Years: 18.00     Pack years: 18.00     Types: Cigarettes, Cigars     Start date: 1963     Last attempt to quit: 1981     Years since quittin.1     Smokeless tobacco: Never Used     Tobacco comment: Previos 1 ppd smoker   Substance Use Topics     Alcohol use: Yes     Comment: Daily coctail       Alcohol Use 2019   If you drink alcohol do you typically have greater than 3 drinks per day OR greater than 7 drinks per week? No               Current providers sharing in care for this patient include:   Patient Care Team:  Remberto Peralta MD as PCP - General  Remberto Peralta MD as PCP - Assigned PCP    The following health maintenance items are reviewed in Epic and correct as of today:  Health Maintenance   Topic Date Due     AORTIC ANEURYSM SCREENING (SYSTEM ASSIGNED)  2010     PNEUMOCOCCAL IMMUNIZATION 65+ LOW/MEDIUM RISK (2 of 2 - PCV13) 2012     ZOSTER IMMUNIZATION (2 of 3) 2012     FALL RISK ASSESSMENT  2015     ADVANCE DIRECTIVE PLANNING Q5 YRS  2017     PHQ-2 Q1 YR  2018     LIPID SCREEN Q5 YR MALE (SYSTEM ASSIGNED)  2022     DTAP/TDAP/TD IMMUNIZATION (3 - Td) 2026     COLONOSCOPY Q10 YR  2027     INFLUENZA VACCINE  Completed     HEPATITIS C SCREENING  Completed     IPV IMMUNIZATION  Aged Out     MENINGITIS IMMUNIZATION  Aged Out       Patient continues on lisinopril monotherapy for hypertension, today's  blood pressure noted.    Had further elevation in PSA to nearly 10 as of last April, after which he visited with urologist at [place].  Had MRI performed which was eventually unremarkable, has not had PSA checked since then.  Watchful waiting was recommended by urologist at [place].    Review of Systems  Constitutional, HEENT, cardiovascular, pulmonary, GI, , musculoskeletal, neuro, skin, endocrine and psych systems are negative, except as otherwise noted.    OBJECTIVE:   /70   Pulse 53   Temp 97.3  F (36.3  C) (Oral)   Resp 16   Wt 88.5 kg (195 lb)   SpO2 98%   BMI 26.45 kg/m   Estimated body mass index is 26.45 kg/m  as calculated from the following:    Height as of 4/4/18: 1.829 m (6').    Weight as of this encounter: 88.5 kg (195 lb).  Physical Exam  GENERAL: healthy, alert and no distress  NECK: no adenopathy, no asymmetry, masses, or scars and thyroid normal to palpation  RESP: lungs clear to auscultation - no rales, rhonchi or wheezes  CV: regular rate and rhythm, normal S1 S2, no S3 or S4, no murmur, click or rub, no peripheral edema and peripheral pulses strong  ABDOMEN: soft, nontender, no hepatosplenomegaly, no masses and bowel sounds normal  MS: no gross musculoskeletal defects noted, no edema        ASSESSMENT / PLAN:   1. Medicare annual wellness visit, subsequent      2. Essential hypertension  Well-controlled.  Check fasting surveillance labs today.  - Lipid panel reflex to direct LDL Fasting  - Basic metabolic panel  (Ca, Cl, CO2, Creat, Gluc, K, Na, BUN)  - tamsulosin (FLOMAX) 0.4 MG capsule; Take 2 capsules (0.8 mg) by mouth daily  Dispense: 180 capsule; Refill: 3    3. Elevated prostate specific antigen (PSA)    - PSA, screen    4. Screening for prostate cancer    - PSA, screen    5. Hypertrophy of prostate with urinary obstruction    - tamsulosin (FLOMAX) 0.4 MG capsule; Take 2 capsules (0.8 mg) by mouth daily  Dispense: 180 capsule; Refill: 3    End of Life Planning:  Patient currently  has an advanced directive: No.  I have verified the patient's ablity to prepare an advanced directive/make health care decisions.  Literature was provided to assist patient in preparing an advanced directive.    COUNSELING:  Reviewed preventive health counseling, as reflected in patient instructions    BP Readings from Last 1 Encounters:   02/06/19 130/70     Estimated body mass index is 26.45 kg/m  as calculated from the following:    Height as of 4/4/18: 1.829 m (6').    Weight as of this encounter: 88.5 kg (195 lb).           reports that he quit smoking about 38 years ago. His smoking use included cigarettes and cigars. He started smoking about 56 years ago. He has a 18.00 pack-year smoking history. he has never used smokeless tobacco.      Appropriate preventive services were discussed with this patient, including applicable screening as appropriate for cardiovascular disease, diabetes, osteopenia/osteoporosis, and glaucoma.  As appropriate for age/gender, discussed screening for colorectal cancer, prostate cancer, breast cancer, and cervical cancer. Checklist reviewing preventive services available has been given to the patient.    Reviewed patients plan of care and provided an AVS. The Basic Care Plan (routine screening as documented in Health Maintenance) for Russel meets the Care Plan requirement. This Care Plan has been established and reviewed with the Patient.    Counseling Resources:  ATP IV Guidelines  Pooled Cohorts Equation Calculator  Breast Cancer Risk Calculator  FRAX Risk Assessment  ICSI Preventive Guidelines  Dietary Guidelines for Americans, 2010  USDA's MyPlate  ASA Prophylaxis  Lung CA Screening    Remberto Peralta MD  [facility]

## 2019-02-06 NOTE — PATIENT INSTRUCTIONS
Preventive Health Recommendations:     See your health care provider every year to    Review health changes.     Discuss preventive care.      Review your medicines if your doctor has prescribed any.    Talk with your health care provider about whether you should have a test to screen for prostate cancer (PSA).    Every 3 years, have a diabetes test (fasting glucose). If you are at risk for diabetes, you should have this test more often.    Every 5 years, have a cholesterol test. Have this test more often if you are at risk for high cholesterol or heart disease.     Every 10 years, have a colonoscopy. Or, have a yearly FIT test (stool test). These exams will check for colon cancer.    Talk to with your health care provider about screening for Abdominal Aortic Aneurysm if you have a family history of AAA or have a history of smoking.  Shots:     Get a flu shot each year.     Get a tetanus shot every 10 years.     Talk to your doctor about your pneumonia vaccines. There are now two you should receive - Pneumovax (PPSV 23) and Prevnar (PCV 13).    Talk to your pharmacist about a shingles vaccine.     Talk to your doctor about the hepatitis B vaccine.  Nutrition:     Eat at least 5 servings of fruits and vegetables each day.     Eat whole-grain bread, whole-wheat pasta and brown rice instead of white grains and rice.     Get adequate Calcium and Vitamin D.   Lifestyle    Exercise for at least 150 minutes a week (30 minutes a day, 5 days a week). This will help you control your weight and prevent disease.     Limit alcohol to one drink per day.     No smoking.     Wear sunscreen to prevent skin cancer.     See your dentist every six months for an exam and cleaning.     See your eye doctor every 1 to 2 years to screen for conditions such as glaucoma, macular degeneration and cataracts.    Personalized Prevention Plan  You are due for the preventive services outlined below.  Your care team is available to assist you in  scheduling these services.  If you have already completed any of these items, please share that information with your care team to update in your medical record.    Health Maintenance Due   Topic Date Due     AORTIC ANEURYSM SCREENING (SYSTEM ASSIGNED)  06/11/2010     Pneumococcal Vaccine (2 of 2 - PCV13) 02/21/2012     Zoster (Shingles) Vaccine (2 of 3) 12/27/2012     FALL RISK ASSESSMENT  07/16/2015     Discuss Advance Directive Planning  05/07/2017     Depression Assessment 2 - yearly  09/13/2018

## 2019-02-07 LAB
ANION GAP SERPL CALCULATED.3IONS-SCNC: 3 MMOL/L (ref 3–14)
BUN SERPL-MCNC: 16 MG/DL (ref 7–30)
CALCIUM SERPL-MCNC: 9.5 MG/DL (ref 8.5–10.1)
CHLORIDE SERPL-SCNC: 105 MMOL/L (ref 94–109)
CHOLEST SERPL-MCNC: 199 MG/DL
CO2 SERPL-SCNC: 29 MMOL/L (ref 20–32)
CREAT SERPL-MCNC: 1.01 MG/DL (ref 0.66–1.25)
GFR SERPL CREATININE-BSD FRML MDRD: 73 ML/MIN/{1.73_M2}
GLUCOSE SERPL-MCNC: 86 MG/DL (ref 70–99)
HDLC SERPL-MCNC: 67 MG/DL
LDLC SERPL CALC-MCNC: 121 MG/DL
NONHDLC SERPL-MCNC: 132 MG/DL
POTASSIUM SERPL-SCNC: 4.1 MMOL/L (ref 3.4–5.3)
PSA SERPL-ACNC: 6.27 UG/L (ref 0–4)
SODIUM SERPL-SCNC: 137 MMOL/L (ref 133–144)
TRIGL SERPL-MCNC: 54 MG/DL

## 2019-05-06 ENCOUNTER — DOCUMENTATION ONLY (OUTPATIENT)
Dept: CARDIOLOGY | Facility: CLINIC | Age: 74
End: 2019-05-06

## 2019-05-20 ENCOUNTER — MYC REFILL (OUTPATIENT)
Dept: INTERNAL MEDICINE | Facility: CLINIC | Age: 74
End: 2019-05-20

## 2019-05-20 ENCOUNTER — MYC MEDICAL ADVICE (OUTPATIENT)
Dept: INTERNAL MEDICINE | Facility: CLINIC | Age: 74
End: 2019-05-20

## 2019-05-20 DIAGNOSIS — B35.4 TINEA CORPORIS: ICD-10-CM

## 2019-05-21 NOTE — TELEPHONE ENCOUNTER
econazole nitrate 1 % external cream         Last Written Prescription Date:  1-27-17  Last Fill Quantity: 30g,   # refills: 1  Last Office Visit: 2-6-19  Future Office visit:       Routing refill request to provider for review/approval because:  Drug not on the [facility], [facility] or [facility] refill protocol or controlled substance

## 2019-05-22 RX ORDER — ECONAZOLE NITRATE 10 MG/G
CREAM TOPICAL DAILY
Qty: 30 G | Refills: 1 | Status: SHIPPED | OUTPATIENT
Start: 2019-05-22 | End: 2020-06-01

## 2019-09-05 DIAGNOSIS — F41.1 ANXIETY STATE: Primary | ICD-10-CM

## 2019-09-05 NOTE — TELEPHONE ENCOUNTER
"Requested Prescriptions   Pending Prescriptions Disp Refills     mirtazapine (REMERON) 30 MG tablet [Pharmacy Med Name: MIRTAZAPINE 30 MG TABLET] 180 tablet 4     Sig: TAKE 1 TABLET BY MOUTH AT BEDTIME. INCREASE BY 1/2 A TAB EVERY 5 DAYS UNTIL TAKING 2 TABS AT BEDTIME       Atypical Antidepressants Protocol Passed - 9/5/2019  7:19 AM        Passed - Recent (12 mo) or future (30 days) visit within the authorizing provider's specialty     Patient had office visit in the last 12 months or has a visit in the next 30 days with authorizing provider or within the authorizing provider's specialty.  See \"Patient Info\" tab in inbasket, or \"Choose Columns\" in Meds & Orders section of the refill encounter.              Passed - Medication active on med list        Passed - Patient is age 18 or older        Last Written Prescription Date:  9/20/2018  Last Fill Quantity: 90,  # refills: 3   Last office visit: 2/6/2019 with prescribing provider:  Remberto Peralta     Future Office Visit:      Mary HAYNES RN, BSN, PHN      "

## 2019-09-05 NOTE — TELEPHONE ENCOUNTER
LM with patient spouse, to have patient call back.    Need to clarify which dose patient is actually taking. historically says 45mg. Request is for 60 mg.     Mary HAYNES RN, BSN, PHN

## 2019-09-06 NOTE — TELEPHONE ENCOUNTER
Called patient and left message that provider needs dose clarification on his remeron medication and to call us back with that information. CTC on file.

## 2019-09-09 ENCOUNTER — TELEPHONE (OUTPATIENT)
Dept: INTERNAL MEDICINE | Facility: CLINIC | Age: 74
End: 2019-09-09

## 2019-09-09 RX ORDER — MIRTAZAPINE 30 MG/1
TABLET, FILM COATED ORAL
Qty: 180 TABLET | Refills: 4 | Status: SHIPPED | OUTPATIENT
Start: 2019-09-09 | End: 2020-09-16

## 2019-09-09 NOTE — TELEPHONE ENCOUNTER
Kyree would like to clarify that he is taking the proper dosages for his mirtazapine medication. He is taking 2- 30mg tablets by mouth at bed time.

## 2019-09-09 NOTE — TELEPHONE ENCOUNTER
Reason for Call:  Other prescription    Detailed comments: Kyree would like to clarify that he is taking the proper dosages for his mirtazapine medication. He is taking 2- 30mg tablets by mouth at bed time.    Phone Number Patient can be reached at: Cell number on file:    [phone]       Best Time: asap    Can we leave a detailed message on this number? YES    Call taken on 9/9/2019 at 8:55 AM by Kayla Calzada

## 2019-09-09 NOTE — TELEPHONE ENCOUNTER
Routing refill request to provider for review/approval because:  Medication is reported/historical    Patient reports he is taking 60mg at bedtime. Please advise if ok with taking over prescription    Mary HAYNES RN, BSN, PHN

## 2019-10-08 ENCOUNTER — MYC MEDICAL ADVICE (OUTPATIENT)
Dept: INTERNAL MEDICINE | Facility: CLINIC | Age: 74
End: 2019-10-08

## 2019-10-08 DIAGNOSIS — R10.13 ABDOMINAL PAIN, EPIGASTRIC: Primary | ICD-10-CM

## 2019-11-04 ENCOUNTER — HEALTH MAINTENANCE LETTER (OUTPATIENT)
Age: 74
End: 2019-11-04

## 2019-11-19 ENCOUNTER — TRANSFERRED RECORDS (OUTPATIENT)
Dept: HEALTH INFORMATION MANAGEMENT | Facility: CLINIC | Age: 74
End: 2019-11-19

## 2019-11-22 ENCOUNTER — CARE COORDINATION (OUTPATIENT)
Dept: CARDIOLOGY | Facility: CLINIC | Age: 74
End: 2019-11-22

## 2019-12-06 DIAGNOSIS — I10 ESSENTIAL HYPERTENSION: ICD-10-CM

## 2019-12-06 RX ORDER — LISINOPRIL 10 MG/1
TABLET ORAL
Qty: 90 TABLET | Refills: 0 | Status: SHIPPED | OUTPATIENT
Start: 2019-12-06 | End: 2020-03-06

## 2019-12-06 NOTE — TELEPHONE ENCOUNTER
Prescription approved per [facility] [place] [place] Refill Protocol.    Mary HERRERAN, RN, PHN

## 2019-12-06 NOTE — TELEPHONE ENCOUNTER
"Requested Prescriptions   Pending Prescriptions Disp Refills     lisinopril (PRINIVIL/ZESTRIL) 10 MG tablet [Pharmacy Med Name: LISINOPRIL 10 MG TABLET] 90 tablet 3     Sig: TAKE 1 TABLET BY MOUTH EVERY DAY   Last Written Prescription Date:  12/4/2018  Last Fill Quantity: 90,  # refills: 3   Last Office Visit: 2/6/2019   Future Office Visit:         ACE Inhibitors (Including Combos) Protocol Passed - 12/6/2019  3:11 AM        Passed - Blood pressure under 140/90 in past 12 months     BP Readings from Last 3 Encounters:   02/06/19 130/70   04/04/18 (!) 138/92   09/13/17 128/80                 Passed - Recent (12 mo) or future (30 days) visit within the authorizing provider's specialty     Patient has had an office visit with the authorizing provider or a provider within the authorizing providers department within the previous 12 mos or has a future within next 30 days. See \"Patient Info\" tab in inbasket, or \"Choose Columns\" in Meds & Orders section of the refill encounter.              Passed - Medication is active on med list        Passed - Patient is age 18 or older        Passed - Normal serum creatinine on file in past 12 months     Recent Labs   Lab Test 02/06/19  1411   CR 1.01             Passed - Normal serum potassium on file in past 12 months     Recent Labs   Lab Test 02/06/19  1411   POTASSIUM 4.1               "

## 2020-03-06 DIAGNOSIS — I10 ESSENTIAL HYPERTENSION: ICD-10-CM

## 2020-03-06 NOTE — LETTER
[facility]  [address] [id number] [phone]            Russel Denny  [address] [facility] [address]        March 9, 2020    Dear Kyree,    While refilling your prescription today, we noticed that you are due for an appointment with your provider.  We will refill your prescription for 30 days, but a follow-up appointment must be made before any additional refills can be approved.     Taking care of your health is important to us and we look forward to seeing you in the near future.  Please call us at [phone] or [phone] (or use [email or web address]) to schedule an appointment.     Please disregard this notice if you have already made an appointment.    Sincerely,        [facility]

## 2020-03-07 NOTE — TELEPHONE ENCOUNTER
"Requested Prescriptions   Pending Prescriptions Disp Refills     lisinopril (ZESTRIL) 10 MG tablet  Last Written Prescription Date:  12/06/2019  Last Fill Quantity: 90,  # refills: 0   Last Office Visit: 2/6/2019   Future Office Visit:      90 tablet 0     Sig: Take 1 tablet (10 mg) by mouth daily       ACE Inhibitors (Including Combos) Protocol Failed - 3/6/2020  8:38 PM        Failed - Blood pressure under 140/90 in past 12 months     BP Readings from Last 3 Encounters:   02/06/19 130/70   04/04/18 (!) 138/92   09/13/17 128/80                 Failed - Recent (12 mo) or future (30 days) visit within the authorizing provider's specialty     Patient has had an office visit with the authorizing provider or a provider within the authorizing providers department within the previous 12 mos or has a future within next 30 days. See \"Patient Info\" tab in inbasket, or \"Choose Columns\" in Meds & Orders section of the refill encounter.              Failed - Normal serum creatinine on file in past 12 months     Recent Labs   Lab Test 02/06/19  1411   CR 1.01             Failed - Normal serum potassium on file in past 12 months     Recent Labs   Lab Test 02/06/19  1411   POTASSIUM 4.1             Passed - Medication is active on med list        Passed - Patient is age 18 or older          "

## 2020-03-09 RX ORDER — LISINOPRIL 10 MG/1
10 TABLET ORAL DAILY
Qty: 30 TABLET | Refills: 0 | Status: SHIPPED | OUTPATIENT
Start: 2020-03-09 | End: 2020-03-23

## 2020-03-23 DIAGNOSIS — I10 ESSENTIAL HYPERTENSION: ICD-10-CM

## 2020-03-23 RX ORDER — LISINOPRIL 10 MG/1
10 TABLET ORAL DAILY
Qty: 30 TABLET | Refills: 0 | Status: SHIPPED | OUTPATIENT
Start: 2020-03-23 | End: 2020-03-25

## 2020-03-23 NOTE — TELEPHONE ENCOUNTER
Requested Prescriptions   Pending Prescriptions Disp Refills     lisinopril (ZESTRIL) 10 MG tablet 30 tablet 0     Sig: Take 1 tablet (10 mg) by mouth daily       There is no refill protocol information for this order        Last Written Prescription Date: 3/09/2020  Last Fill Quantity: 30,  # refills: 0   Last Office Visit: 2/6/2019   Future Office Visit:

## 2020-03-23 NOTE — TELEPHONE ENCOUNTER
"Requested Prescriptions   Pending Prescriptions Disp Refills     lisinopril (ZESTRIL) 10 MG tablet 30 tablet 0     Sig: Take 1 tablet (10 mg) by mouth daily       ACE Inhibitors (Including Combos) Protocol Failed - 3/23/2020 11:03 AM        Failed - Blood pressure under 140/90 in past 12 months     BP Readings from Last 3 Encounters:   02/06/19 130/70   04/04/18 (!) 138/92   09/13/17 128/80                 Failed - Normal serum creatinine on file in past 12 months     Recent Labs   Lab Test 02/06/19  1411   CR 1.01       Ok to refill medication if creatinine is low          Failed - Normal serum potassium on file in past 12 months     Recent Labs   Lab Test 02/06/19  1411   POTASSIUM 4.1             Passed - Recent (12 mo) or future (30 days) visit within the authorizing provider's specialty     Patient has had an office visit with the authorizing provider or a provider within the authorizing providers department within the previous 12 mos or has a future within next 30 days. See \"Patient Info\" tab in inbasket, or \"Choose Columns\" in Meds & Orders section of the refill encounter.              Passed - Medication is active on med list        Passed - Patient is age 18 or older             "

## 2020-03-25 ENCOUNTER — MYC MEDICAL ADVICE (OUTPATIENT)
Dept: INTERNAL MEDICINE | Facility: CLINIC | Age: 75
End: 2020-03-25

## 2020-03-25 DIAGNOSIS — I10 ESSENTIAL HYPERTENSION: ICD-10-CM

## 2020-03-25 RX ORDER — LISINOPRIL 10 MG/1
10 TABLET ORAL DAILY
Qty: 30 TABLET | Refills: 0 | Status: SHIPPED | OUTPATIENT
Start: 2020-03-25 | End: 2020-06-01

## 2020-03-25 NOTE — TELEPHONE ENCOUNTER
PCP please see MyChart message are you ok with extending Lisinopril refill for 90 day?     Labs were not current on recent oral refill. Would you like him to complete labs any time soon?      Mary HERRERAN, RN, PHN

## 2020-03-30 NOTE — PROGRESS NOTES
"Subjective     Russel Denny is a 74 year old male who is being evaluated via a billable telephone visit.      The patient has been notified of following:     \"This telephone visit will be conducted via a call between you and your physician/provider. We have found that certain health care needs can be provided without the need for a physical exam.  This service lets us provide the care you need with a short phone conversation.  If a prescription is necessary we can send it directly to your pharmacy.  If lab work is needed we can place an order for that and you can then stop by our lab to have the test done at a later time.    If during the course of the call the physician/provider feels a telephone visit is not appropriate, you will not be charged for this service.\"     Patient has given verbal consent for Telephone visit?  Yes    Russel Denny complains of No chief complaint on file.      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 No     Are you following a low salt diet? No    Are your blood pressures ever more than 140 on the top number (systolic) OR more   than 90 on the bottom number (diastolic), for example 140/90? N/a       How many servings of fruits and vegetables do you eat daily?  0-1    On average, how many sweetened beverages do you drink each day (Examples: soda, juice, sweet tea, etc.  Do NOT " count diet or artificially sweetened beverages)?   0    How many days per week do you exercise enough to make your heart beat faster? None- still working     How many minutes a day do you exercise enough to make your heart beat faster? N/a     How many days per week do you miss taking your medication? 0                        Reviewed and updated as needed this visit by Provider         Review of Systems   Constitutional, HEENT, cardiovascular, pulmonary, GI, , musculoskeletal, neuro, skin, endocrine and psych systems are negative, except as otherwise noted.       Objective   Reported vitals:  There were no vitals taken for this visit.   healthy, alert and no distress  Psych: Alert and oriented times 3; coherent speech, normal   rate and volume, able to articulate logical thoughts, able   to abstract reason, no tangential thoughts, no hallucinations   or delusions  His affect is normal             Assessment/Plan:  1. Essential hypertension  Continue lisinopril  - lisinopril (ZESTRIL) 10 MG tablet; Take 1 tablet (10 mg) by mouth daily  Dispense: 90 tablet; Refill: 1    No follow-ups on file.      Phone call duration:  7 minutes        ]

## 2020-04-02 ENCOUNTER — VIRTUAL VISIT (OUTPATIENT)
Dept: INTERNAL MEDICINE | Facility: CLINIC | Age: 75
End: 2020-04-02
Payer: COMMERCIAL

## 2020-04-02 DIAGNOSIS — I10 ESSENTIAL HYPERTENSION: ICD-10-CM

## 2020-04-02 PROCEDURE — 99213 OFFICE O/P EST LOW 20 MIN: CPT | Mod: 95 | Performed by: INTERNAL MEDICINE

## 2020-06-01 ENCOUNTER — MYC REFILL (OUTPATIENT)
Dept: INTERNAL MEDICINE | Facility: CLINIC | Age: 75
End: 2020-06-01

## 2020-06-01 DIAGNOSIS — I10 ESSENTIAL HYPERTENSION: ICD-10-CM

## 2020-06-01 DIAGNOSIS — B35.4 TINEA CORPORIS: ICD-10-CM

## 2020-06-02 NOTE — TELEPHONE ENCOUNTER
Routing refill request to provider for review/approval because:  Drug not on the [facility] refill protocol   Needs 90 days instead of oral

## 2020-06-05 RX ORDER — LISINOPRIL 10 MG/1
10 TABLET ORAL DAILY
Qty: 90 TABLET | Refills: 0 | Status: SHIPPED | OUTPATIENT
Start: 2020-06-05 | End: 2020-09-09

## 2020-06-05 RX ORDER — ECONAZOLE NITRATE 10 MG/G
CREAM TOPICAL DAILY
Qty: 30 G | Refills: 1 | Status: SHIPPED | OUTPATIENT
Start: 2020-06-05 | End: 2021-06-07

## 2020-06-17 RX ORDER — LISINOPRIL 10 MG/1
10 TABLET ORAL DAILY
Qty: 90 TABLET | Refills: 1 | Status: SHIPPED | OUTPATIENT
Start: 2020-06-17 | End: 2021-04-01

## 2020-07-29 ENCOUNTER — VIRTUAL VISIT (OUTPATIENT)
Dept: INTERNAL MEDICINE | Facility: CLINIC | Age: 75
End: 2020-07-29
Payer: COMMERCIAL

## 2020-07-29 DIAGNOSIS — R04.2 HEMOPTYSIS: Primary | ICD-10-CM

## 2020-07-29 DIAGNOSIS — J31.0 CHRONIC RHINITIS: ICD-10-CM

## 2020-07-29 PROCEDURE — 99213 OFFICE O/P EST LOW 20 MIN: CPT | Mod: 95 | Performed by: INTERNAL MEDICINE

## 2020-07-29 ASSESSMENT — ACTIVITIES OF DAILY LIVING (ADL): CURRENT_FUNCTION: NO ASSISTANCE NEEDED

## 2020-08-13 ENCOUNTER — HOSPITAL ENCOUNTER (OUTPATIENT)
Dept: CT IMAGING | Facility: CLINIC | Age: 75
Discharge: HOME | End: 2020-08-13
Attending: INTERNAL MEDICINE | Admitting: INTERNAL MEDICINE
Payer: COMMERCIAL

## 2020-08-13 DIAGNOSIS — R04.2 HEMOPTYSIS: ICD-10-CM

## 2020-08-13 PROCEDURE — 71250 CT THORAX DX C-: CPT

## 2020-08-18 ENCOUNTER — MYC MEDICAL ADVICE (OUTPATIENT)
Dept: INTERNAL MEDICINE | Facility: CLINIC | Age: 75
End: 2020-08-18

## 2020-09-09 ENCOUNTER — VIRTUAL VISIT (OUTPATIENT)
Dept: INTERNAL MEDICINE | Facility: CLINIC | Age: 75
End: 2020-09-09
Payer: COMMERCIAL

## 2020-09-09 DIAGNOSIS — I10 ESSENTIAL HYPERTENSION: Primary | ICD-10-CM

## 2020-09-09 DIAGNOSIS — R04.2 SPUTUM BLOODY: ICD-10-CM

## 2020-09-09 DIAGNOSIS — M19.90 OSTEOARTHRITIS, UNSPECIFIED OSTEOARTHRITIS TYPE, UNSPECIFIED SITE: ICD-10-CM

## 2020-09-09 PROCEDURE — 99214 OFFICE O/P EST MOD 30 MIN: CPT | Mod: 95 | Performed by: INTERNAL MEDICINE

## 2020-09-09 NOTE — PROGRESS NOTES
"Russel Denny is a 75 year old male who is being evaluated via a billable video visit.      The patient has been notified of following:     \"This video visit will be conducted via a call between you and your physician/provider. We have found that certain health care needs can be provided without the need for an in-person physical exam.  This service lets us provide the care you need with a video conversation.  If a prescription is necessary we can send it directly to your pharmacy.  If lab work is needed we can place an order for that and you can then stop by our lab to have the test done at a later time.    Video visits are billed at different rates depending on your insurance coverage.  Please reach out to your insurance provider with any questions.    If during the course of the call the physician/provider feels a video visit is not appropriate, you will not be charged for this service.\"    Patient has given verbal consent for Video visit? Yes  How would you like to obtain your AVS? [employer]  If you are dropped from the video visit, the video invite should be resent to: Other e-mail: through [employer]  Will anyone else be joining your video visit? No    Subjective     Russel Denny is a 75 year old male who presents today via video visit for the following health issues:    HPI    Discuss CT scan of chest done on 8/13/2020. Still producing blood when coughing up phlegm.        Video Start Time: 4:02PM      Amount of phlegm that is actually coughed up is minuscule.  Reviewed results of recent CT, which showed emphysematous changes, some incidentally seen coronary atherosclerosis, but no pulmonary nodules or masses.  He complains of absolutely no unusual dyspnea on exertion, maintains a very active lifestyle.    Also complains of pain in various joints, including first CMC joints bilaterally, bilateral knees.  Pain is not debilitating, usually only transient started per day, usually controlled with " over-the-counter analgesics.        Review of Systems   Constitutional, HEENT, cardiovascular, pulmonary, GI, , musculoskeletal, neuro, skin, endocrine and psych systems are negative, except as otherwise noted.      Objective           Vitals:  No vitals were obtained today due to virtual visit.    Physical Exam     GENERAL: Healthy, alert and no distress  EYES: Eyes grossly normal to inspection.  No discharge or erythema, or obvious scleral/conjunctival abnormalities.  RESP: No audible wheeze, cough, or visible cyanosis.  No visible retractions or increased work of breathing.    SKIN: Visible skin clear. No significant rash, abnormal pigmentation or lesions.  NEURO: Cranial nerves grossly intact.  Mentation and speech appropriate for age.  PSYCH: Mentation appears normal, affect normal/bright, judgement and insight intact, normal speech and appearance well-groomed.              Assessment & Plan     Essential hypertension  Due for surveillance labs.  Discussed the fact that after next cholesterol test, will have a low threshold to start statin therapy, based on the incidentally seen coronary atherosclerosis on the CT scan.  - Lipid panel reflex to direct LDL Fasting; Future  - Comprehensive metabolic panel; Future    Sputum bloody  CT scan was normal, no concern    Osteoarthritis, unspecified osteoarthritis type, unspecified site  Recommended over-the-counter analgesics             No follow-ups on file.    Remberto Peralta MD  [facility]      Video-Visit Details    Type of service:  Video Visit    Video End Time:4:20 PM    Originating Location (pt. Location): Home    Distant Location (provider location):  [facility]     Platform used for Video Visit: [employer]

## 2020-09-16 DIAGNOSIS — F41.1 ANXIETY STATE: ICD-10-CM

## 2020-09-16 RX ORDER — MIRTAZAPINE 30 MG/1
TABLET, FILM COATED ORAL
Qty: 180 TABLET | Refills: 3 | Status: SHIPPED | OUTPATIENT
Start: 2020-09-16 | End: 2021-08-30

## 2020-11-16 ENCOUNTER — HEALTH MAINTENANCE LETTER (OUTPATIENT)
Age: 75
End: 2020-11-16

## 2021-03-22 ENCOUNTER — MYC MEDICAL ADVICE (OUTPATIENT)
Dept: INTERNAL MEDICINE | Facility: CLINIC | Age: 76
End: 2021-03-22

## 2021-03-23 ENCOUNTER — MYC MEDICAL ADVICE (OUTPATIENT)
Dept: INTERNAL MEDICINE | Facility: CLINIC | Age: 76
End: 2021-03-23

## 2021-04-01 ENCOUNTER — OFFICE VISIT (OUTPATIENT)
Dept: INTERNAL MEDICINE | Facility: CLINIC | Age: 76
End: 2021-04-01
Payer: COMMERCIAL

## 2021-04-01 VITALS
HEART RATE: 55 BPM | SYSTOLIC BLOOD PRESSURE: 126 MMHG | WEIGHT: 189.4 LBS | BODY MASS INDEX: 25.69 KG/M2 | DIASTOLIC BLOOD PRESSURE: 56 MMHG | OXYGEN SATURATION: 99 % | TEMPERATURE: 97.3 F

## 2021-04-01 DIAGNOSIS — Z00.00 MEDICARE ANNUAL WELLNESS VISIT, SUBSEQUENT: Primary | ICD-10-CM

## 2021-04-01 DIAGNOSIS — E73.9 INTESTINAL DISACCHARIDASE DEFICIENCIES AND DISACCHARIDE MALABSORPTION: ICD-10-CM

## 2021-04-01 DIAGNOSIS — I10 ESSENTIAL HYPERTENSION: ICD-10-CM

## 2021-04-01 PROCEDURE — 99397 PER PM REEVAL EST PAT 65+ YR: CPT | Performed by: INTERNAL MEDICINE

## 2021-04-01 PROCEDURE — 99214 OFFICE O/P EST MOD 30 MIN: CPT | Mod: 25 | Performed by: INTERNAL MEDICINE

## 2021-04-01 RX ORDER — DILTIAZEM HYDROCHLORIDE 240 MG/1
CAPSULE, EXTENDED RELEASE ORAL DAILY
COMMUNITY
Start: 2021-04-01 | End: 2024-07-24

## 2021-04-01 RX ORDER — LISINOPRIL 20 MG/1
20 TABLET ORAL DAILY
Qty: 90 TABLET | Refills: 3 | COMMUNITY
Start: 2021-04-01 | End: 2024-04-11

## 2021-04-01 RX ORDER — LOPERAMIDE HYDROCHLORIDE 2 MG/1
1 TABLET ORAL DAILY
Qty: 45 TABLET | Refills: 3 | Status: SHIPPED | OUTPATIENT
Start: 2021-04-01 | End: 2022-05-18

## 2021-04-01 ASSESSMENT — ACTIVITIES OF DAILY LIVING (ADL): CURRENT_FUNCTION: NO ASSISTANCE NEEDED

## 2021-04-01 NOTE — PATIENT INSTRUCTIONS
- Use the Flonase nasal spray nightly, before bed.  (Available over-the-counter)  - Perform the nasal saline rinse at least once daily, right before giving yourself the medicated nasal spray.

## 2021-04-01 NOTE — PROGRESS NOTES
"SUBJECTIVE:   Russel Denny is a 75 year old male who presents for Preventive Visit.      Patient has been advised of split billing requirements and indicates understanding: Yes   Are you in the first 12 months of your Medicare coverage?  No    Healthy Habits:    In general, how would you rate your overall health?  Very good    Frequency of exercise:  6-7 days/week    Duration of exercise:  Greater than 60 minutes    Do you usually eat at least 4 servings of fruit and vegetables a day, include whole grains    & fiber and avoid regularly eating high fat or \"junk\" foods?  Yes    Taking medications regularly:  Yes    Barriers to taking medications:  None    Medication side effects:  None    Ability to successfully perform activities of daily living:  No assistance needed    Home Safety:  No safety concerns identified    Hearing Impairment:  Need to ask people to speak up or repeat themselves    In the past 6 months, have you been bothered by leaking of urine?  No    In general, how would you rate your overall mental or emotional health?  Good      PHQ-2 Total Score:    Additional concerns today:  No    Do you feel safe in your environment? Yes    Have you ever done Advance Care Planning? (For example, a Health Directive, POLST, or a discussion with a medical provider or your loved ones about your wishes): Yes, advance care planning is on file.       Fall risk  Fallen 2 or more times in the past year?: No  Any fall with injury in the past year?: No    Cognitive Screening   1) Repeat 3 items (Leader, Season, Table)    2) Clock draw: NORMAL  3) 3 item recall: Recalls 3 objects  Results: 3 items recalled: COGNITIVE IMPAIRMENT LESS LIKELY    Mini-CogTM Copyright ION Kelly. Licensed by the author for use in [facility]; reprinted with permission (robbi@.[facility]). All rights reserved.          Reviewed and updated as needed this visit by clinical staff  Tobacco  Allergies  Meds  Problems  Med Hx  Surg Hx "  Fam Hx          Reviewed and updated as needed this visit by Provider  Tobacco  Allergies  Meds  Problems  Med Hx  Surg Hx  Fam Hx         Social History     Tobacco Use     Smoking status: Former Smoker     Packs/day: 1.00     Years: 18.00     Pack years: 18.00     Types: Cigarettes, Cigars     Start date: 1963     Quit date: 1981     Years since quittin.2     Smokeless tobacco: Never Used     Tobacco comment: Previos 1 ppd smoker   Substance Use Topics     Alcohol use: Yes     Comment: Daily coctail         Alcohol Use 2020   Prescreen: >3 drinks/day or >7 drinks/week? No   Prescreen: >3 drinks/day or >7 drinks/week? -               Current providers sharing in care for this patient include:   Patient Care Team:  Remberto Peralta MD as PCP - General  Remberto Peralta MD as Assigned PCP  Russel Lizama RN as VAD Coordinator (Cardiology)    The following health maintenance items are reviewed in Epic and correct as of today:  Health Maintenance Due   Topic Date Due     AORTIC ANEURYSM SCREENING (SYSTEM ASSIGNED)  Never done     ZOSTER IMMUNIZATION (2 of 3) 2012     ADVANCE CARE PLANNING  2017     INFLUENZA VACCINE (1) 2020     PHQ-2  2021     FALL RISK ASSESSMENT  2021             Review of Systems  Constitutional, HEENT, cardiovascular, pulmonary, GI, , musculoskeletal, neuro, skin, endocrine and psych systems are negative, except as otherwise noted.    OBJECTIVE:   /56   Pulse 55   Temp 97.3  F (36.3  C) (Tympanic)   Wt 85.9 kg (189 lb 6.4 oz)   SpO2 99%   BMI 25.69 kg/m   Estimated body mass index is 25.69 kg/m  as calculated from the following:    Height as of 18: 1.829 m (6').    Weight as of this encounter: 85.9 kg (189 lb 6.4 oz).  Physical Exam  GENERAL: healthy, alert and no distress  NECK: no adenopathy, no asymmetry, masses, or scars and thyroid normal to palpation  RESP: lungs clear to auscultation - no rales,  rhonchi or wheezes  CV: regular rate and rhythm, normal S1 S2, no S3 or S4, no murmur, click or rub, no peripheral edema and peripheral pulses strong  ABDOMEN: soft, nontender, no hepatosplenomegaly, no masses and bowel sounds normal  MS: no gross musculoskeletal defects noted, no edema    Diagnostic Test Results:  Labs reviewed in Epic    ASSESSMENT / PLAN:   1. Medicare annual wellness visit, subsequent      2. 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    Weight as of this encounter: 85.9 kg (189 lb 6.4 oz).        He reports that he quit smoking about 40 years ago. His smoking use included cigarettes and cigars. He started smoking about 58 years ago. He has a 18.00 pack-year smoking history. He has never used smokeless tobacco.      Appropriate preventive services were discussed with this patient, including applicable screening as appropriate for cardiovascular disease, diabetes, osteopenia/osteoporosis, and glaucoma.  As appropriate for age/gender, discussed screening for colorectal cancer, prostate cancer, breast cancer, and cervical cancer. Checklist reviewing preventive services available has been given to the patient.    Reviewed patients plan of care and provided an AVS. The Basic Care Plan (routine screening as documented in Health Maintenance) for Russel meets the Care Plan requirement. This Care Plan has been established and reviewed with the  Patient.    Counseling Resources:  ATP IV Guidelines  Pooled Cohorts Equation Calculator  Breast Cancer Risk Calculator  Breast Cancer: Medication to Reduce Risk  FRAX Risk Assessment  ICSI Preventive Guidelines  Dietary Guidelines for Americans, 2010  USDA's MyPlate  ASA Prophylaxis  Lung CA Screening    Remberto Peralta MD  [facility]    Identified Health Risks:

## 2021-04-28 ENCOUNTER — MYC MEDICAL ADVICE (OUTPATIENT)
Dept: INTERNAL MEDICINE | Facility: CLINIC | Age: 76
End: 2021-04-28

## 2021-04-28 DIAGNOSIS — D22.9 BENIGN MOLE: Primary | ICD-10-CM

## 2021-05-18 ENCOUNTER — MYC MEDICAL ADVICE (OUTPATIENT)
Dept: INTERNAL MEDICINE | Facility: CLINIC | Age: 76
End: 2021-05-18

## 2021-06-06 DIAGNOSIS — B35.4 TINEA CORPORIS: ICD-10-CM

## 2021-06-07 RX ORDER — ECONAZOLE NITRATE 10 MG/G
CREAM TOPICAL
Qty: 30 G | Refills: 1 | Status: SHIPPED | OUTPATIENT
Start: 2021-06-07

## 2021-07-01 NOTE — PROGRESS NOTES
"Russel Denny is a 75 year old male who is being evaluated via a billable telephone visit.      The patient has been notified of following:     \"This telephone visit will be conducted via a call between you and your physician/provider. We have found that certain health care needs can be provided without the need for a physical exam.  This service lets us provide the care you need with a short phone conversation.  If a prescription is necessary we can send it directly to your pharmacy.  If lab work is needed we can place an order for that and you can then stop by our lab to have the test done at a later time.    Telephone visits are billed at different rates depending on your insurance coverage. During this emergency period, for some insurers they may be billed the same as an in-person visit.  Please reach out to your insurance provider with any questions.    If during the course of the call the physician/provider feels a telephone visit is not appropriate, you will not be charged for this service.\"    Patient has given verbal consent for Telephone visit?  Yes    What phone number would you like to be contacted at? [phone]    How would you like to obtain your AVS? MyChart    Subjective     Russel Denny is a 75 year old male who presents via phone visit today for the following health issues:    HPI    RESPIRATORY SYMPTOMS      Duration: months    Description  nasal congestion, cough and phlem red in color    Severity: moderate    Accompanying signs and symptoms: bitter taste, film in mouth    History (predisposing factors):  none    Precipitating or alleviating factors: chew gum    Therapies tried and outcome:  none    Pt unable to get camera working going to reschedule video visit for wellness       Persistent and intermittent cough, occasionally productive of bright red sputum.  Does have associated subjective rhinitis and postnasal drip, also has occasional bitter taste in his mouth but no overt " Progress Note - Gattis Phalen 79 y o  female MRN: [id number]    Unit/Bed#Lily Haley 201-01 Encounter: [id number]        Subjective:   Patient seen and examined at bedside after reviewing the chart and discussing the case with the caring staff  Patient examined at bedside  Patient reports her right foot pain and swelling are improving  Physical Exam   Vitals: Blood pressure (!) 178/91, pulse 68, temperature 97 8 °F (36 6 °C), temperature source Temporal, resp  rate 16, height 5' 3" (1 6 m), weight 101 kg (221 lb 12 5 oz), SpO2 96 %  ,Body mass index is 39 29 kg/m²  Constitutional: He appears well-developed  HEENT: PERR, EOMI, MMM  Cardiovascular: Normal rate and regular rhythm  Pulmonary/Chest: Effort normal and breath sounds normal    Abdomen: Soft, + BS, NT    Assessment/Plan:  Gattis Phalen is a(n) 79y o  year old female with MDD with psychotic symptoms     1  Cardiac with history of hypertension, dyslipidemia, CHF, atrial fibrillation, coronary artery disease status post pacemaker placement  Patient will be continued on metoprolol 25 mg b i d , amlodipine 5 mg daily, Imdur 30 mg daily, Pravachol 20 mg daily and Eliquis 5 mg 2 times daily  2  Neuropathy  Patient is on gabapentin 200 mg 4 times daily  3  GERD  Patient is on Protonix 40 mg daily  4  DJD/osteoarthritis  I will also put the patient on Voltaren gel to be applied over the affected area  I will put the patient on Lidoderm patch for shoulder pain and lower back pain  Will start the patient on tramadol 50 mg every 6 hours as needed for severe pain  5  Gait abnormality  I will consult PT OT for further evaluation and treatment  6  Acute UTI  Patient's urine culture is negative for UTI I will discontinue the antibiotics  7  New vitamin-D deficiency  I will put the patient on vitamin-D bolus doses for 8 weeks followed by vitamin D3 1000 units daily  8  New vitamin B12 deficiency    I will put the patient on monthly B12 "\"heartburn.\"      Reviewed and updated as needed this visit by Provider         Review of Systems   Constitutional, HEENT, cardiovascular, pulmonary, GI, , musculoskeletal, neuro, skin, endocrine and psych systems are negative, except as otherwise noted.       Objective   Reported vitals:  There were no vitals taken for this visit.   healthy, alert and no distress  PSYCH: Alert and oriented times 3; coherent speech, normal   rate and volume, able to articulate logical thoughts, able   to abstract reason, no tangential thoughts, no hallucinations   or delusions  His affect is normal  RESP: No cough, no audible wheezing, able to talk in full sentences  Remainder of exam unable to be completed due to telephone visits          Assessment/Plan:    1. Hemoptysis  Past history of smoking, will check chest CT.  - CT Chest w/o Contrast; Future    2. Chronic rhinitis  We will start treating chronic rhinitis more aggressively as means of hopefully helping his cough.  Suggested daily use of nasal steroid spray and buffered saline lavage.  No need for antibiotic therapy at present.      No follow-ups on file.      Phone call duration:  6 minutes    Remberto Peralta MD        " injections  9  Overgrown toenails and now acute right foot pain  Podiatry has been consulted  Patient's x-ray of the right foot completed on 06/28/2021 showed no acute osseous abnormalities  The patient was discussed with Dr Miguel Richardson and he is in agreement with the above note

## 2021-07-16 NOTE — TELEPHONE ENCOUNTER
Approved, at [facility].   
Requested Prescriptions   Pending Prescriptions Disp Refills     clonazePAM (KLONOPIN) 0.5 MG tablet 180 tablet 0    There is no refill protocol information for this order   Last Written Prescription Date:  03/07/2018  Last Fill Quantity: 180,  # refills: 0   Last office visit: 4/4/2018 with prescribing provider:  04/04/2018   Future Office Visit:           
Rx called into pharmacy.  
done  
Initial (On Arrival)

## 2021-08-29 DIAGNOSIS — F41.1 ANXIETY STATE: ICD-10-CM

## 2021-08-30 RX ORDER — MIRTAZAPINE 30 MG/1
TABLET, FILM COATED ORAL
Qty: 180 TABLET | Refills: 1 | Status: SHIPPED | OUTPATIENT
Start: 2021-08-30 | End: 2022-02-20

## 2021-10-14 ENCOUNTER — LAB (OUTPATIENT)
Dept: LAB | Facility: CLINIC | Age: 76
End: 2021-10-14
Payer: COMMERCIAL

## 2021-10-14 DIAGNOSIS — C61 PROSTATE CANCER (H): ICD-10-CM

## 2021-10-14 LAB — PSA SERPL-MCNC: 3.41 UG/L (ref 0–4)

## 2021-10-14 PROCEDURE — 84153 ASSAY OF PSA TOTAL: CPT

## 2021-10-14 PROCEDURE — 36415 COLL VENOUS BLD VENIPUNCTURE: CPT

## 2022-01-04 ENCOUNTER — OFFICE VISIT (OUTPATIENT)
Dept: DERMATOLOGY | Facility: CLINIC | Age: 77
End: 2022-01-04
Attending: INTERNAL MEDICINE
Payer: COMMERCIAL

## 2022-01-04 VITALS — DIASTOLIC BLOOD PRESSURE: 73 MMHG | OXYGEN SATURATION: 98 % | HEART RATE: 63 BPM | SYSTOLIC BLOOD PRESSURE: 147 MMHG

## 2022-01-04 DIAGNOSIS — D48.5 NEOPLASM OF UNCERTAIN BEHAVIOR OF SKIN: ICD-10-CM

## 2022-01-04 DIAGNOSIS — L82.1 SEBORRHEIC KERATOSIS: ICD-10-CM

## 2022-01-04 DIAGNOSIS — D18.01 ANGIOMA OF SKIN: ICD-10-CM

## 2022-01-04 DIAGNOSIS — L81.4 LENTIGO: ICD-10-CM

## 2022-01-04 DIAGNOSIS — D22.9 NEVUS: Primary | ICD-10-CM

## 2022-01-04 PROCEDURE — 11102 TANGNTL BX SKIN SINGLE LES: CPT | Performed by: PHYSICIAN ASSISTANT

## 2022-01-04 PROCEDURE — 88305 TISSUE EXAM BY PATHOLOGIST: CPT | Performed by: DERMATOLOGY

## 2022-01-04 PROCEDURE — 99203 OFFICE O/P NEW LOW 30 MIN: CPT | Mod: 25 | Performed by: PHYSICIAN ASSISTANT

## 2022-01-04 PROCEDURE — 11103 TANGNTL BX SKIN EA SEP/ADDL: CPT | Performed by: PHYSICIAN ASSISTANT

## 2022-01-04 NOTE — PROGRESS NOTES
HPI:   Chief complaints: Russel Denny is a pleasant 76 year old male who presents for Full skin cancer screening to rule out skin cancer   Last Skin Exam: several years ago      1st Baseline: no  Personal HX of Skin Cancer: no   Personal HX of Malignant Melanoma: no   Family HX of Skin Cancer / Malignant Melanoma: no  Personal HX of Atypical Moles:   no  Risk factors: history of sun exposure and burns  New / Changing lesions:yes irritated spot on the back that seems to be growing  Social History: lives in [place]. 2 granddaughters who live in [place]  On review of systems, there are no further skin complaints, patient is feeling otherwise well.   ROS of the following were done and are negative: Constitutional, Eyes, Ears, Nose,   Mouth, Throat, Cardiovascular, Respiratory, GI, Genitourinary, Musculoskeletal,   Psychiatric, Endocrine, Allergic/Immunologic.    PHYSICAL EXAM:   BP (!) 147/73   Pulse 63   SpO2 98%   Skin exam performed as follows: Type 2 skin. Mood appropriate  Alert and Oriented X 3. Well developed, well nourished in no distress.  General appearance: Normal  Head including face: Normal  Eyes: conjunctiva and lids: Normal  Mouth: Lips, teeth, gums: Normal  Neck: Normal  Chest-breast/axillae: Normal  Back: Normal  Spleen and liver: Normal  Cardiovascular: Exam of peripheral vascular system by observation for swelling, varicosities, edema: Normal  Genitalia: groin, buttocks: Normal  Extremities: digits/nails (clubbing): Normal  Eccrine and Apocrine glands: Normal  Right upper extremity: Normal  Left upper extremity: Normal  Right lower extremity: Normal  Left lower extremity: Normal  Skin: Scalp and body hair: See below    Pt deferred exam of breasts, groin, buttocks: No    Other physical findings:  1. Multiple pigmented macules on extremities and trunk  2. Multiple pigmented macules on face, trunk and extremities  3. Multiple vascular papules on trunk, arms and legs  4. Multiple scattered  keratotic plaques  5. 2 mm black macule on the left upper arm  6. 15 mm keratotic plaque on the right mid back       Except as noted above, no other signs of skin cancer or melanoma.     ASSESSMENT/PLAN:   Benign Full skin cancer screening today. . Patient with history of none  Advised on monthly self exams and 1 year  Patient Education: Appropriate brochures given.    1. Multiple benign appearing melanocytic nevi on arms, legs and trunk. Discussed ABCDEs of melanoma and sunscreen.   2. Multiple lentigos on arms, legs and trunk. Advised benign, no treatment needed.  3. Multiple scattered angiomas. Advised benign, no treatment needed.   4. Seborrheic keratosis on arms, legs and trunk. Advised benign, no treatment needed.  5. Atypical nevus on the left upper arm. Shave biopsy performed.  Area cleaned and anesthetized with 1% lidocaine with epinephrine.  Dermablade used to remove the lesion and sent to pathology. Bleeding was cauterized. Pt tolerated procedure well with no complications.   6. SK r/o atypicality on the right mid back. Shave biopsy performed.  Area cleaned and anesthetized with 1% lidocaine with epinephrine.  Dermablade used to remove the lesion and sent to pathology. Bleeding was cauterized. Pt tolerated procedure well with no complications.             Follow-up: yearly FSE/PRN sooner    1.) Patient was asked about new and changing moles. YES  2.) Patient received a complete physical skin examination: YES  3.) Patient was counseled to perform a monthly self skin examination: YES  Scribed By: Kayla Carr, MS, ELENITA

## 2022-01-04 NOTE — PATIENT INSTRUCTIONS
Wound Care Instructions     FOR SUPERFICIAL WOUNDS     [facility]  [phone]                 AFTER 24 HOURS YOU SHOULD REMOVE THE BANDAGE AND BEGIN DAILY DRESSING CHANGES AS FOLLOWS:     1) Remove Dressing.     2) Clean and dry the area with tap water using a Q-tip or sterile gauze pad.     3) Apply Vaseline, Aquaphor, Polysporin ointment or Bacitracin ointment over entire wound.  Do NOT use Neosporin ointment.     4) Cover the wound with a band-aid, or a sterile non-stick gauze pad and micropore paper tape    REPEAT THESE INSTRUCTIONS AT LEAST ONCE A DAY UNTIL THE WOUND HAS COMPLETELY HEALED.    It is an old wives tale that a wound heals better when it is exposed to air and allowed to dry out. The wound will heal faster with a better cosmetic result if it is kept moist with ointment and covered with a bandage.    **Do not let the wound dry out.**    Supplies Needed:      *Cotton tipped applicators (Q-tips)    *Vaseline, Aquaphor, Polysporin or Bacitracin Ointment (NOT NEOSPORIN)    *Band-aids or non-stick gauze pads and micropore paper tape.      PATIENT INFORMATION:    During the healing process you will notice a number of changes. All wounds develop a small halo of redness surrounding the wound.  This means healing is occurring. Severe itching with extensive redness usually indicates sensitivity to the ointment or bandage tape used to dress the wound.  You should call our office if this develops.      Swelling  and/or discoloration around your surgical site is common, particularly when performed around the eye.    All wounds normally drain.  The larger the wound the more drainage there will be.  After 7-10 days, you will notice the wound beginning to shrink and new skin will begin to grow.  The wound is healed when you can see skin has formed over the entire area.  A healed wound has a healthy, shiny look to the surface and is red to dark pink in color to normalize.  Wounds may  take approximately 4-6 weeks to heal.  Larger wounds may take 6-8 weeks.  After the wound is healed you may discontinue dressing changes.    You may experience a sensation of tightness as your wound heals. This is normal and will gradually subside.    Your healed wound may be sensitive to temperature changes. This sensitivity improves with time, but if you re having a lot of discomfort, try to avoid temperature extremes.    Patients frequently experience itching after their wound appears to have healed because of the continue healing under the skin.  Plain Vaseline will help relieve the itching.      POSSIBLE COMPLICATIONS    BLEEDIN. Leave the bandage in place.  2. Use tightly rolled up gauze or a cloth to apply direct pressure over the bandage for 30  minutes.  3. Reapply pressure for an additional 30 minutes if necessary  4. Use additional gauze and tape to maintain pressure once the bleeding has stopped.

## 2022-01-04 NOTE — LETTER
1/4/2022         RE: Russel Denny  [address] [address]        Dear Colleague,    Thank you for referring your patient, Russel Denny, to the [facility]. Please see a copy of my visit note below.    HPI:   Chief complaints: Russel Denny is a pleasant 76 year old male who presents for Full skin cancer screening to rule out skin cancer   Last Skin Exam: several years ago      1st Baseline: no  Personal HX of Skin Cancer: no   Personal HX of Malignant Melanoma: no   Family HX of Skin Cancer / Malignant Melanoma: no  Personal HX of Atypical Moles:   no  Risk factors: history of sun exposure and burns  New / Changing lesions:yes irritated spot on the back that seems to be growing  Social History: lives in [place]. 2 granddaughters who live in [place]  On review of systems, there are no further skin complaints, patient is feeling otherwise well.   ROS of the following were done and are negative: Constitutional, Eyes, Ears, Nose,   Mouth, Throat, Cardiovascular, Respiratory, GI, Genitourinary, Musculoskeletal,   Psychiatric, Endocrine, Allergic/Immunologic.    PHYSICAL EXAM:   BP (!) 147/73   Pulse 63   SpO2 98%   Skin exam performed as follows: Type 2 skin. Mood appropriate  Alert and Oriented X 3. Well developed, well nourished in no distress.  General appearance: Normal  Head including face: Normal  Eyes: conjunctiva and lids: Normal  Mouth: Lips, teeth, gums: Normal  Neck: Normal  Chest-breast/axillae: Normal  Back: Normal  Spleen and liver: Normal  Cardiovascular: Exam of peripheral vascular system by observation for swelling, varicosities, edema: Normal  Genitalia: groin, buttocks: Normal  Extremities: digits/nails (clubbing): Normal  Eccrine and Apocrine glands: Normal  Right upper extremity: Normal  Left upper extremity: Normal  Right lower extremity: Normal  Left lower extremity: Normal  Skin: Scalp and body hair: See below    Pt  deferred exam of breasts, groin, buttocks: No    Other physical findings:  1. Multiple pigmented macules on extremities and trunk  2. Multiple pigmented macules on face, trunk and extremities  3. Multiple vascular papules on trunk, arms and legs  4. Multiple scattered keratotic plaques  5. 2 mm black macule on the left upper arm  6. 15 mm keratotic plaque on the right mid back       Except as noted above, no other signs of skin cancer or melanoma.     ASSESSMENT/PLAN:   Benign Full skin cancer screening today. . Patient with history of none  Advised on monthly self exams and 1 year  Patient Education: Appropriate brochures given.    1. Multiple benign appearing melanocytic nevi on arms, legs and trunk. Discussed ABCDEs of melanoma and sunscreen.   2. Multiple lentigos on arms, legs and trunk. Advised benign, no treatment needed.  3. Multiple scattered angiomas. Advised benign, no treatment needed.   4. Seborrheic keratosis on arms, legs and trunk. Advised benign, no treatment needed.  5. Atypical nevus on the left upper arm. Shave biopsy performed.  Area cleaned and anesthetized with 1% lidocaine with epinephrine.  Dermablade used to remove the lesion and sent to pathology. Bleeding was cauterized. Pt tolerated procedure well with no complications.   6. SK r/o atypicality on the right mid back. Shave biopsy performed.  Area cleaned and anesthetized with 1% lidocaine with epinephrine.  Dermablade used to remove the lesion and sent to pathology. Bleeding was cauterized. Pt tolerated procedure well with no complications.             Follow-up: yearly FSE/PRN sooner    1.) Patient was asked about new and changing moles. YES  2.) Patient received a complete physical skin examination: YES  3.) Patient was counseled to perform a monthly self skin examination: YES  Scribed By: Kayla Carr, MS, PA-C          Again, thank you for allowing me to participate in the care of your patient.        Sincerely,        Kayla CESAR  ELENITA Estrada

## 2022-01-06 LAB
PATH REPORT.COMMENTS IMP SPEC: NORMAL
PATH REPORT.FINAL DX SPEC: NORMAL
PATH REPORT.GROSS SPEC: NORMAL
PATH REPORT.MICROSCOPIC SPEC OTHER STN: NORMAL
PATH REPORT.RELEVANT HX SPEC: NORMAL

## 2022-02-20 DIAGNOSIS — F41.1 ANXIETY STATE: ICD-10-CM

## 2022-02-20 RX ORDER — MIRTAZAPINE 30 MG/1
TABLET, FILM COATED ORAL
Qty: 180 TABLET | Refills: 0 | Status: SHIPPED | OUTPATIENT
Start: 2022-02-20 | End: 2022-05-17

## 2022-05-17 DIAGNOSIS — E73.9 INTESTINAL DISACCHARIDASE DEFICIENCIES AND DISACCHARIDE MALABSORPTION: ICD-10-CM

## 2022-05-18 RX ORDER — LOPERAMIDE HCL 2 MG
TABLET ORAL
Qty: 45 TABLET | Refills: 3 | Status: SHIPPED | OUTPATIENT
Start: 2022-05-18

## 2022-05-18 NOTE — TELEPHONE ENCOUNTER
loperamide (IMODIUM A-D) 2 MG tablet 45 tablet 3 4/1/2021  --   Sig - Route: Take 0.5 tablets (1 mg) by mouth daily - Oral   Sent to pharmacy as: Loperamide HCl 2 MG Oral Tablet (IMODIUM A-D)   Class: E-Prescribe   Notes to Pharmacy: Profile Rx: patient will contact pharmacy when needed   Order: [id number]       Last office visit: 4/1/2021     Future Office Visit:        Routing refill request to provider for review/approval because:  Drug not on the [facility] refill protocol     If PCP needs to have team do something (appointment etc ) please route to your team - thank you     Elsie Crawford RN, BSN  [facility]

## 2022-06-09 ENCOUNTER — OFFICE VISIT (OUTPATIENT)
Dept: INTERNAL MEDICINE | Facility: CLINIC | Age: 77
End: 2022-06-09
Payer: COMMERCIAL

## 2022-06-09 VITALS
WEIGHT: 189.5 LBS | HEIGHT: 72 IN | TEMPERATURE: 97.2 F | OXYGEN SATURATION: 97 % | BODY MASS INDEX: 25.67 KG/M2 | RESPIRATION RATE: 16 BRPM | DIASTOLIC BLOOD PRESSURE: 60 MMHG | SYSTOLIC BLOOD PRESSURE: 130 MMHG | HEART RATE: 60 BPM

## 2022-06-09 DIAGNOSIS — I48.0 PAROXYSMAL ATRIAL FIBRILLATION (H): ICD-10-CM

## 2022-06-09 DIAGNOSIS — C61 MALIGNANT NEOPLASM OF PROSTATE (H): ICD-10-CM

## 2022-06-09 DIAGNOSIS — Z00.00 MEDICARE ANNUAL WELLNESS VISIT, SUBSEQUENT: Primary | ICD-10-CM

## 2022-06-09 PROCEDURE — 99397 PER PM REEVAL EST PAT 65+ YR: CPT | Performed by: INTERNAL MEDICINE

## 2022-06-09 ASSESSMENT — ENCOUNTER SYMPTOMS
DIARRHEA: 0
WEAKNESS: 0
CHILLS: 0
COUGH: 0
EYE PAIN: 0
SHORTNESS OF BREATH: 0
PALPITATIONS: 0
JOINT SWELLING: 1
ARTHRALGIAS: 1
CONSTIPATION: 0
NAUSEA: 0
PARESTHESIAS: 0
HEMATOCHEZIA: 0
FREQUENCY: 0
HEMATURIA: 0
HEARTBURN: 0
ABDOMINAL PAIN: 0
NERVOUS/ANXIOUS: 0
SORE THROAT: 0
DYSURIA: 0
DIZZINESS: 0
FEVER: 0
HEADACHES: 0
MYALGIAS: 0

## 2022-06-09 ASSESSMENT — ACTIVITIES OF DAILY LIVING (ADL): CURRENT_FUNCTION: NO ASSISTANCE NEEDED

## 2022-06-09 NOTE — PATIENT INSTRUCTIONS
Patient Education   Personalized Prevention Plan  You are due for the preventive services outlined below.  Your care team is available to assist you in scheduling these services.  If you have already completed any of these items, please share that information with your care team to update in your medical record.  Health Maintenance Due   Topic Date Due     ANNUAL REVIEW OF HM ORDERS  Never done     Zoster (Shingles) Vaccine (2 of 3) 12/27/2012     PHQ-2 (once per calendar year)  01/01/2022     FALL RISK ASSESSMENT  04/01/2022

## 2022-06-09 NOTE — PROGRESS NOTES
"SUBJECTIVE:   Russel Denny is a 76 year old male who presents for Preventive Visit.      Patient has been advised of split billing requirements and indicates understanding: Yes  Are you in the first 12 months of your Medicare coverage?  No    Healthy Habits:     In general, how would you rate your overall health?  Excellent    Frequency of exercise:  6-7 days/week    Duration of exercise:  15-30 minutes    Do you usually eat at least 4 servings of fruit and vegetables a day, include whole grains    & fiber and avoid regularly eating high fat or \"junk\" foods?  Yes    Taking medications regularly:  Yes    Medication side effects:  None    Ability to successfully perform activities of daily living:  No assistance needed    Home Safety:  Lack of grab bars in the bathroom    Hearing Impairment:  Difficulty following a conversation in a noisy restaurant or crowded room, feel that people are mumbling or not speaking clearly, difficulty following dialogue in the theater, difficult to understand a speaker at a public meeting or Jainism service, need to ask people to speak up or repeat themselves, find that men's voices are easier to understand than woman's, difficulty understanding soft or whispered speech and difficulty understanding speech on the telephone    In the past 6 months, have you been bothered by leaking of urine?  No    In general, how would you rate your overall mental or emotional health?  Excellent      PHQ-2 Total Score: 0    Additional concerns today:  No    Do you feel safe in your environment? Yes    Have you ever done Advance Care Planning? (For example, a Health Directive, POLST, or a discussion with a medical provider or your loved ones about your wishes): No, advance care planning information given to patient to review.  Patient plans to discuss their wishes with loved ones or provider.         Fall risk  Fallen 2 or more times in the past year?: No  Any fall with injury in the past year?: " No    Cognitive Screening   1) Repeat 3 items (Leader, Season, Table)    2) Clock draw: NORMAL  3) 3 item recall: Recalls 3 objects  Results: 3 items recalled: COGNITIVE IMPAIRMENT LESS LIKELY    Mini-CogTM Copyright ION Kelly. Licensed by the author for use in [facility]; reprinted with permission (robbi@[facility]). All rights reserved.      Do you have sleep apnea, excessive snoring or daytime drowsiness?: no    Reviewed and updated as needed this visit by clinical staff   Tobacco  Allergies  Meds                Reviewed and updated as needed this visit by Provider                   Social History     Tobacco Use     Smoking status: Former Smoker     Packs/day: 1.00     Years: 18.00     Pack years: 18.00     Types: Cigarettes, Cigars     Start date: 1963     Quit date: 1981     Years since quittin.4     Smokeless tobacco: Never Used     Tobacco comment: Previos 1 ppd smoker   Substance Use Topics     Alcohol use: Yes     Comment: Daily coctail     If you drink alcohol do you typically have >3 drinks per day or >7 drinks per week? Yes      Alcohol Use 2022   Prescreen: >3 drinks/day or >7 drinks/week? No   Prescreen: >3 drinks/day or >7 drinks/week? -   AUDIT SCORE  4     AUDIT - Alcohol Use Disorders Identification Test - Reproduced from the World Health Organization Audit 2001 (Second Edition) 2022   1.  How often do you have a drink containing alcohol? 4 or more times a week   2.  How many drinks containing alcohol do you have on a typical day when you are drinking? 1 or 2   3.  How often do you have five or more drinks on one occasion? Never   4.  How often during the last year have you found that you were not able to stop drinking once you had started? Never   5.  How often during the last year have you failed to do what was normally expected of you because of drinking? Never   6.  How often during the last year have you needed a first drink in the morning to get yourself going  after a heavy drinking session? Never   7.  How often during the last year have you had a feeling of guilt or remorse after drinking? Never   8.  How often during the last year have you been unable to remember what happened the night before because of your drinking? Never   9.  Have you or someone else been injured because of your drinking? No   10. Has a relative, friend, doctor or other health care worker been concerned about your drinking or suggested you cut down? No   TOTAL SCORE 4               Current providers sharing in care for this patient include:   Patient Care Team:  Remberto Peralta MD as PCP - General  Remberto Peralta MD as Assigned PCP  Russel Lizama RN as VAD Coordinator (Cardiology)  Kayla Carr PA-C as Assigned Surgical Provider    The following health maintenance items are reviewed in Epic and correct as of today:  Health Maintenance Due   Topic Date Due     ANNUAL REVIEW OF HM ORDERS  Never done     ZOSTER IMMUNIZATION (2 of 3) 12/27/2012               Review of Systems  Constitutional, HEENT, cardiovascular, pulmonary, GI, , musculoskeletal, neuro, skin, endocrine and psych systems are negative, except as otherwise noted.    OBJECTIVE:   /60 (BP Location: Left arm, Patient Position: Chair, Cuff Size: Adult Regular)   Pulse 60   Temp 97.2  F (36.2  C) (Temporal)   Resp 16   Ht 1.829 m (6')   Wt 86 kg (189 lb 8 oz)   SpO2 97%   BMI 25.70 kg/m   Estimated body mass index is 25.7 kg/m  as calculated from the following:    Height as of this encounter: 1.829 m (6').    Weight as of this encounter: 86 kg (189 lb 8 oz).  Physical Exam  GENERAL: healthy, alert and no distress  NECK: no adenopathy, no asymmetry, masses, or scars and thyroid normal to palpation  RESP: lungs clear to auscultation - no rales, rhonchi or wheezes  CV: regular rate and rhythm, normal S1 S2, no S3 or S4, no murmur, click or rub, no peripheral edema and peripheral pulses  strong  ABDOMEN: soft, nontender, no hepatosplenomegaly, no masses and bowel sounds normal  MS: no gross musculoskeletal defects noted, no edema        ASSESSMENT / PLAN:   Medicare annual wellness visit, subsequent      Paroxysmal atrial fibrillation (H)  Regular rhythm with CCB    Malignant neoplasm of prostate (H)  Continues to follow-up closely with [facility] for this issue.          COUNSELING:  Reviewed preventive health counseling, as reflected in patient instructions    Estimated body mass index is 25.7 kg/m  as calculated from the following:    Height as of this encounter: 1.829 m (6').    Weight as of this encounter: 86 kg (189 lb 8 oz).        He reports that he quit smoking about 41 years ago. His smoking use included cigarettes and cigars. He started smoking about 59 years ago. He has a 18.00 pack-year smoking history. He has never used smokeless tobacco.      Appropriate preventive services were discussed with this patient, including applicable screening as appropriate for cardiovascular disease, diabetes, osteopenia/osteoporosis, and glaucoma.  As appropriate for age/gender, discussed screening for colorectal cancer, prostate cancer, breast cancer, and cervical cancer. Checklist reviewing preventive services available has been given to the patient.    Reviewed patients plan of care and provided an AVS. The Basic Care Plan (routine screening as documented in Health Maintenance) for Russel meets the Care Plan requirement. This Care Plan has been established and reviewed with the Patient.    Counseling Resources:  ATP IV Guidelines  Pooled Cohorts Equation Calculator  Breast Cancer Risk Calculator  Breast Cancer: Medication to Reduce Risk  FRAX Risk Assessment  ICSI Preventive Guidelines  Dietary Guidelines for Americans, 2010  BCKSTGR's MyPlate  ASA Prophylaxis  Lung CA Screening    Remberto Peralta MD  [facility]    Identified Health Risks:

## 2022-07-26 DIAGNOSIS — F41.1 ANXIETY STATE: ICD-10-CM

## 2022-07-28 RX ORDER — MIRTAZAPINE 30 MG/1
TABLET, FILM COATED ORAL
Qty: 180 TABLET | Refills: 1 | Status: SHIPPED | OUTPATIENT
Start: 2022-07-28 | End: 2023-01-19

## 2022-07-28 NOTE — TELEPHONE ENCOUNTER
Prescription approved per [facility] Refill Protocol.  Lamont Tate RN  [facility] Triage Nurse

## 2022-10-24 ENCOUNTER — MYC MEDICAL ADVICE (OUTPATIENT)
Dept: INTERNAL MEDICINE | Facility: CLINIC | Age: 77
End: 2022-10-24

## 2022-10-24 DIAGNOSIS — Z12.5 SCREENING FOR PROSTATE CANCER: Primary | ICD-10-CM

## 2022-10-26 NOTE — TELEPHONE ENCOUNTER
Future order placed - not sure if he'll be able to schedule lab appt on such short notice - please assist.

## 2022-10-27 ENCOUNTER — LAB (OUTPATIENT)
Dept: LAB | Facility: CLINIC | Age: 77
End: 2022-10-27
Payer: COMMERCIAL

## 2022-10-27 DIAGNOSIS — Z12.5 SCREENING FOR PROSTATE CANCER: ICD-10-CM

## 2022-10-27 LAB — PSA SERPL-MCNC: 4.08 UG/L (ref 0–4)

## 2022-10-27 PROCEDURE — 36415 COLL VENOUS BLD VENIPUNCTURE: CPT

## 2022-10-27 PROCEDURE — G0103 PSA SCREENING: HCPCS

## 2022-11-20 ENCOUNTER — HEALTH MAINTENANCE LETTER (OUTPATIENT)
Age: 77
End: 2022-11-20

## 2023-04-13 DIAGNOSIS — F41.1 ANXIETY STATE: ICD-10-CM

## 2023-04-13 RX ORDER — MIRTAZAPINE 30 MG/1
60 TABLET, FILM COATED ORAL AT BEDTIME
Qty: 180 TABLET | Refills: 0 | Status: SHIPPED | OUTPATIENT
Start: 2023-04-13 | End: 2023-07-13

## 2023-04-13 NOTE — TELEPHONE ENCOUNTER
Appointments in Next Year    Jun 28, 2023  2:00 PM  (Arrive by 1:40 PM)  MEDICARE ANNUAL WELLNESS VISIT with Remberto Peralta MD  [facility] ([facility] ) [phone]        Last Rx sent with tapering sig: Sig: TAKE 1 TABLET BY MOUTH AT BEDTIME. INCREASE BY 1/2 A TAB EVERY 5 DAYS UNTIL TAKING 2 TABS AT BEDTIME    Pended Rx for 2 tabs at bedtime    Simón Melendez RN  [facility]

## 2023-06-28 ENCOUNTER — OFFICE VISIT (OUTPATIENT)
Dept: INTERNAL MEDICINE | Facility: CLINIC | Age: 78
End: 2023-06-28
Payer: COMMERCIAL

## 2023-06-28 VITALS
SYSTOLIC BLOOD PRESSURE: 122 MMHG | BODY MASS INDEX: 26.11 KG/M2 | HEART RATE: 59 BPM | HEIGHT: 72 IN | WEIGHT: 192.8 LBS | RESPIRATION RATE: 16 BRPM | OXYGEN SATURATION: 97 % | TEMPERATURE: 98.3 F | DIASTOLIC BLOOD PRESSURE: 78 MMHG

## 2023-06-28 DIAGNOSIS — C61 MALIGNANT NEOPLASM OF PROSTATE (H): ICD-10-CM

## 2023-06-28 DIAGNOSIS — Z00.00 MEDICARE ANNUAL WELLNESS VISIT, SUBSEQUENT: Primary | ICD-10-CM

## 2023-06-28 DIAGNOSIS — I48.0 PAROXYSMAL ATRIAL FIBRILLATION (H): ICD-10-CM

## 2023-06-28 PROCEDURE — G0439 PPPS, SUBSEQ VISIT: HCPCS | Performed by: INTERNAL MEDICINE

## 2023-06-28 ASSESSMENT — ENCOUNTER SYMPTOMS
CHILLS: 0
DIARRHEA: 0
WEAKNESS: 1
PALPITATIONS: 0
JOINT SWELLING: 0
MYALGIAS: 0
FREQUENCY: 0
DIZZINESS: 0
COUGH: 0
HEADACHES: 0
SORE THROAT: 0
HEMATOCHEZIA: 0
FEVER: 0
PARESTHESIAS: 0
EYE PAIN: 0
HEMATURIA: 0
CONSTIPATION: 0
NAUSEA: 0
ARTHRALGIAS: 1
SHORTNESS OF BREATH: 0
DYSURIA: 0
HEARTBURN: 0
ABDOMINAL PAIN: 0
NERVOUS/ANXIOUS: 0

## 2023-06-28 ASSESSMENT — ACTIVITIES OF DAILY LIVING (ADL): CURRENT_FUNCTION: NO ASSISTANCE NEEDED

## 2023-06-28 NOTE — PROGRESS NOTES
"SUBJECTIVE:   Kyree is a 78 year old who presents for Preventive Visit.    Are you in the first 12 months of your Medicare coverage?  No    Healthy Habits:     In general, how would you rate your overall health?  Good    Frequency of exercise:  1 day/week    Duration of exercise:  15-30 minutes    Do you usually eat at least 4 servings of fruit and vegetables a day, include whole grains    & fiber and avoid regularly eating high fat or \"junk\" foods?  No    Medication side effects:  None    Ability to successfully perform activities of daily living:  No assistance needed    Home Safety:  No safety concerns identified    Hearing Impairment:  Difficulty following a conversation in a noisy restaurant or crowded room, feel that people are mumbling or not speaking clearly, difficulty following dialogue in the theater, difficult to understand a speaker at a public meeting or Confucianist service, need to ask people to speak up or repeat themselves, find that men's voices are easier to understand than woman's, difficulty understanding soft or whispered speech and difficulty understanding speech on the telephone    In the past 6 months, have you been bothered by leaking of urine?  No    In general, how would you rate your overall mental or emotional health?  Good      PHQ-2 Total Score: 0    Additional concerns today:  No        Have you ever done Advance Care Planning? (For example, a Health Directive, POLST, or a discussion with a medical provider or your loved ones about your wishes): Yes, advance care planning is on file.       Fall risk  Fallen 2 or more times in the past year?: No  Any fall with injury in the past year?: No    Cognitive Screening   1) Repeat 3 items (Leader, Season, Table)    2) Clock draw: NORMAL  3) 3 item recall: Recalls 3 objects  Results: 3 items recalled: COGNITIVE IMPAIRMENT LESS LIKELY    Mini-CogTM Copyright S Robin. Licensed by the author for use in [facility]; reprinted with " permission (robbi@[facility]). All rights reserved.          Reviewed and updated as needed this visit by clinical staff   Tobacco  Allergies  Meds              Reviewed and updated as needed this visit by Provider                 Social History     Tobacco Use     Smoking status: Former     Packs/day: 1.00     Years: 18.00     Pack years: 18.00     Types: Cigarettes, Cigars     Start date: 1963     Quit date: 1981     Years since quittin.5     Smokeless tobacco: Never     Tobacco comments:     Previos 1 ppd smoker   Substance Use Topics     Alcohol use: Yes     Comment: Daily coctail             2023     4:33 PM   Alcohol Use   Prescreen: >3 drinks/day or >7 drinks/week? No     Do you have a current opioid prescription? No  Do you use any other controlled substances or medications that are not prescribed by a provider? None              Current providers sharing in care for this patient include:   Patient Care Team:  Remberto Peralta MD as PCP - General  Remberto Peralta MD as Assigned PCP  Russel Lizama RN as VAD Coordinator (Cardiology)  Kayla Carr PA-C as Assigned Surgical Provider    The following health maintenance items are reviewed in Epic and correct as of today:  Health Maintenance   Topic Date Due     ANNUAL REVIEW OF HM ORDERS  Never done     ZOSTER IMMUNIZATION (2 of 3) 2012     COVID-19 Vaccine (5 - Moderna series) 02/10/2023     MEDICARE ANNUAL WELLNESS VISIT  2023     INFLUENZA VACCINE (Season Ended) 2023     LIPID  2024     FALL RISK ASSESSMENT  2024     DTAP/TDAP/TD IMMUNIZATION (2 - Td or Tdap) 2026     COLORECTAL CANCER SCREENING  2027     ADVANCE CARE PLANNING  2028     HEPATITIS C SCREENING  Completed     PHQ-2 (once per calendar year)  Completed     Pneumococcal Vaccine: 65+ Years  Completed     IPV IMMUNIZATION  Aged Out     MENINGITIS IMMUNIZATION  Aged Out     LUNG CANCER SCREENING  Discontinued                Review of Systems   Constitutional: Negative for chills and fever.   HENT: Positive for hearing loss. Negative for congestion, ear pain and sore throat.    Eyes: Negative for pain and visual disturbance.   Respiratory: Negative for cough and shortness of breath.    Cardiovascular: Negative for chest pain, palpitations and peripheral edema.   Gastrointestinal: Negative for abdominal pain, constipation, diarrhea, heartburn, hematochezia and nausea.   Genitourinary: Negative for dysuria, frequency, genital sores, hematuria, impotence, penile discharge and urgency.   Musculoskeletal: Positive for arthralgias. Negative for joint swelling and myalgias.   Skin: Negative for rash.   Neurological: Positive for weakness. Negative for dizziness, headaches and paresthesias.   Psychiatric/Behavioral: Negative for mood changes. The patient is not nervous/anxious.          OBJECTIVE:   /78   Pulse 59   Temp 98.3  F (36.8  C) (Tympanic)   Resp 16   Ht 1.829 m (6')   Wt 87.5 kg (192 lb 12.8 oz)   SpO2 97%   BMI 26.15 kg/m   Estimated body mass index is 26.15 kg/m  as calculated from the following:    Height as of this encounter: 1.829 m (6').    Weight as of this encounter: 87.5 kg (192 lb 12.8 oz).  Physical Exam  GENERAL: healthy, alert and no distress  NECK: no adenopathy, no asymmetry, masses, or scars and thyroid normal to palpation  RESP: lungs clear to auscultation - no rales, rhonchi or wheezes  CV: regular rate and rhythm, normal S1 S2, no S3 or S4, no murmur, click or rub, no peripheral edema and peripheral pulses strong  ABDOMEN: soft, nontender, no hepatosplenomegaly, no masses and bowel sounds normal  MS: no gross musculoskeletal defects noted, no edema        ASSESSMENT / PLAN:   Medicare annual wellness visit, subsequent      Malignant neoplasm of prostate (H)  Continues to follow with urology at [facility]    Paroxysmal atrial fibrillation (H)  Has been in regular rhythm with calcium channel  blocker            COUNSELING:          He reports that he quit smoking about 42 years ago. His smoking use included cigarettes and cigars. He started smoking about 60 years ago. He has a 18.00 pack-year smoking history. He has never used smokeless tobacco.      Appropriate preventive services were discussed with this patient, including applicable screening as appropriate for cardiovascular disease, diabetes, osteopenia/osteoporosis, and glaucoma.  As appropriate for age/gender, discussed screening for colorectal cancer, prostate cancer, breast cancer, and cervical cancer. Checklist reviewing preventive services available has been given to the patient.    Reviewed patients plan of care and provided an AVS. The Basic Care Plan (routine screening as documented in Health Maintenance) for Russel meets the Care Plan requirement. This Care Plan has been established and reviewed with the Patient.      Remberto Peralta MD  [facility]    Identified Health Risks:

## 2023-07-13 DIAGNOSIS — F41.1 ANXIETY STATE: ICD-10-CM

## 2023-07-13 RX ORDER — MIRTAZAPINE 30 MG/1
60 TABLET, FILM COATED ORAL AT BEDTIME
Qty: 180 TABLET | Refills: 0 | Status: SHIPPED | OUTPATIENT
Start: 2023-07-13 | End: 2023-10-12

## 2023-10-12 DIAGNOSIS — F41.1 ANXIETY STATE: ICD-10-CM

## 2023-10-12 RX ORDER — MIRTAZAPINE 30 MG/1
60 TABLET, FILM COATED ORAL AT BEDTIME
Qty: 180 TABLET | Refills: 0 | Status: SHIPPED | OUTPATIENT
Start: 2023-10-12 | End: 2024-01-08

## 2024-01-07 DIAGNOSIS — F41.1 ANXIETY STATE: ICD-10-CM

## 2024-01-08 RX ORDER — MIRTAZAPINE 30 MG/1
60 TABLET, FILM COATED ORAL AT BEDTIME
Qty: 180 TABLET | Refills: 0 | Status: SHIPPED | OUTPATIENT
Start: 2024-01-08 | End: 2024-04-08

## 2024-01-08 NOTE — TELEPHONE ENCOUNTER
Prescription approved per [facility] Refill Protocol.  Ashli Alexander RN  [facility]

## 2024-04-07 DIAGNOSIS — F41.1 ANXIETY STATE: ICD-10-CM

## 2024-04-08 RX ORDER — MIRTAZAPINE 30 MG/1
60 TABLET, FILM COATED ORAL AT BEDTIME
Qty: 180 TABLET | Refills: 0 | Status: SHIPPED | OUTPATIENT
Start: 2024-04-08 | End: 2024-07-10

## 2024-04-11 ENCOUNTER — MYC REFILL (OUTPATIENT)
Dept: INTERNAL MEDICINE | Facility: CLINIC | Age: 79
End: 2024-04-11
Payer: COMMERCIAL

## 2024-04-11 DIAGNOSIS — F41.1 GENERALIZED ANXIETY DISORDER: ICD-10-CM

## 2024-04-11 DIAGNOSIS — I10 ESSENTIAL HYPERTENSION: Primary | ICD-10-CM

## 2024-04-12 RX ORDER — LISINOPRIL 20 MG/1
20 TABLET ORAL DAILY
Qty: 90 TABLET | Refills: 3 | Status: SHIPPED | OUTPATIENT
Start: 2024-04-12 | End: 2024-07-24

## 2024-04-12 RX ORDER — CLONAZEPAM 0.5 MG/1
TABLET ORAL
Qty: 180 TABLET | Refills: 0 | Status: SHIPPED | OUTPATIENT
Start: 2024-04-12

## 2024-07-10 DIAGNOSIS — F41.1 ANXIETY STATE: ICD-10-CM

## 2024-07-10 RX ORDER — MIRTAZAPINE 30 MG/1
60 TABLET, FILM COATED ORAL AT BEDTIME
Qty: 180 TABLET | Refills: 0 | Status: SHIPPED | OUTPATIENT
Start: 2024-07-10 | End: 2024-10-07

## 2024-07-24 ENCOUNTER — OFFICE VISIT (OUTPATIENT)
Dept: INTERNAL MEDICINE | Facility: CLINIC | Age: 79
End: 2024-07-24
Payer: COMMERCIAL

## 2024-07-24 VITALS
SYSTOLIC BLOOD PRESSURE: 128 MMHG | WEIGHT: 190.4 LBS | HEIGHT: 72 IN | RESPIRATION RATE: 13 BRPM | TEMPERATURE: 97.9 F | HEART RATE: 60 BPM | OXYGEN SATURATION: 99 % | DIASTOLIC BLOOD PRESSURE: 70 MMHG | BODY MASS INDEX: 25.79 KG/M2

## 2024-07-24 DIAGNOSIS — I10 ESSENTIAL HYPERTENSION: ICD-10-CM

## 2024-07-24 DIAGNOSIS — I48.0 PAROXYSMAL ATRIAL FIBRILLATION (H): ICD-10-CM

## 2024-07-24 DIAGNOSIS — C61 MALIGNANT NEOPLASM OF PROSTATE (H): ICD-10-CM

## 2024-07-24 DIAGNOSIS — Z00.00 MEDICARE ANNUAL WELLNESS VISIT, SUBSEQUENT: Primary | ICD-10-CM

## 2024-07-24 LAB
ALBUMIN SERPL BCG-MCNC: 4.4 G/DL (ref 3.5–5.2)
ALP SERPL-CCNC: 58 U/L (ref 40–150)
ALT SERPL W P-5'-P-CCNC: 25 U/L (ref 0–70)
ANION GAP SERPL CALCULATED.3IONS-SCNC: 9 MMOL/L (ref 7–15)
AST SERPL W P-5'-P-CCNC: 19 U/L (ref 0–45)
BILIRUB SERPL-MCNC: 0.5 MG/DL
BUN SERPL-MCNC: 22.8 MG/DL (ref 8–23)
CALCIUM SERPL-MCNC: 9.6 MG/DL (ref 8.8–10.4)
CHLORIDE SERPL-SCNC: 106 MMOL/L (ref 98–107)
CHOLEST SERPL-MCNC: 184 MG/DL
CREAT SERPL-MCNC: 0.99 MG/DL (ref 0.67–1.17)
EGFRCR SERPLBLD CKD-EPI 2021: 77 ML/MIN/1.73M2
FASTING STATUS PATIENT QL REPORTED: YES
FASTING STATUS PATIENT QL REPORTED: YES
GLUCOSE SERPL-MCNC: 96 MG/DL (ref 70–99)
HCO3 SERPL-SCNC: 27 MMOL/L (ref 22–29)
HDLC SERPL-MCNC: 54 MG/DL
LDLC SERPL CALC-MCNC: 114 MG/DL
NONHDLC SERPL-MCNC: 130 MG/DL
POTASSIUM SERPL-SCNC: 4.3 MMOL/L (ref 3.4–5.3)
PROT SERPL-MCNC: 7.5 G/DL (ref 6.4–8.3)
SODIUM SERPL-SCNC: 142 MMOL/L (ref 135–145)
TRIGL SERPL-MCNC: 82 MG/DL

## 2024-07-24 PROCEDURE — 99214 OFFICE O/P EST MOD 30 MIN: CPT | Mod: 25 | Performed by: INTERNAL MEDICINE

## 2024-07-24 PROCEDURE — 80053 COMPREHEN METABOLIC PANEL: CPT | Performed by: INTERNAL MEDICINE

## 2024-07-24 PROCEDURE — 80061 LIPID PANEL: CPT | Performed by: INTERNAL MEDICINE

## 2024-07-24 PROCEDURE — G0439 PPPS, SUBSEQ VISIT: HCPCS | Performed by: INTERNAL MEDICINE

## 2024-07-24 PROCEDURE — 36415 COLL VENOUS BLD VENIPUNCTURE: CPT | Performed by: INTERNAL MEDICINE

## 2024-07-24 RX ORDER — LISINOPRIL 20 MG/1
20 TABLET ORAL 2 TIMES DAILY
Qty: 180 TABLET | Refills: 3 | Status: SHIPPED | OUTPATIENT
Start: 2024-07-24

## 2024-07-24 RX ORDER — DILTIAZEM HYDROCHLORIDE 240 MG/1
240 CAPSULE, EXTENDED RELEASE ORAL DAILY
Qty: 90 CAPSULE | Refills: 3 | Status: SHIPPED | OUTPATIENT
Start: 2024-07-24

## 2024-07-24 NOTE — PROGRESS NOTES
Preventive Care Visit  [facility]  Remberto Peralta MD, Internal Medicine  Jul 24, 2024      Assessment & Plan     Medicare annual wellness visit, subsequent      Essential hypertension    - lisinopril (ZESTRIL) 20 MG tablet; Take 1 tablet (20 mg) by mouth 2 times daily  - diltiazem ER (DILT-XR) 240 MG 24 hr ER beaded capsule; Take 1 capsule (240 mg) by mouth daily  - Lipid panel reflex to direct LDL Fasting; Future  - Comprehensive metabolic panel (BMP + Alb, Alk Phos, ALT, AST, Total. Bili, TP); Future  - Lipid panel reflex to direct LDL Fasting  - Comprehensive metabolic panel (BMP + Alb, Alk Phos, ALT, AST, Total. Bili, TP)    Paroxysmal atrial fibrillation (H)  Rate control and anticoagulation    Malignant neoplasm of prostate (H)  Follows [facility] urology            BMI  Estimated body mass index is 25.82 kg/m  as calculated from the following:    Height as of this encounter: 1.829 m (6').    Weight as of this encounter: 86.4 kg (190 lb 6.4 oz).       Counseling  Appropriate preventive services were addressed with this patient via screening, questionnaire, or discussion as appropriate for fall prevention, nutrition, physical activity, Tobacco-use cessation, social engagement, weight loss and cognition.  Checklist reviewing preventive services available has been given to the patient.          Duke Adorno is a 79 year old, presenting for the following:  Medicare Visit (Not fasting)          Health Care Directive  Patient has a Health Care Directive on file  Discussed advance care planning with patient. Pt will bring copy in.     HPI    Pt takes 2 tabs daily of 20 mg of Lisinopril and the last prescription he got was only 1 tab 20 mg daily.     Pt takes 90 mg of the Diltiazem instead of the 240 mg.     Discuss lump on right leg  Arthritis in hands  Skin cancer screening  A-Fib  Atrophy                 7/23/2024   General Health   How would you rate your overall physical health? Excellent    Feel stress (tense, anxious, or unable to sleep) To some extent      (!) STRESS CONCERN      7/23/2024   Nutrition   Diet: Regular (no restrictions)            7/23/2024   Exercise   Days per week of moderate/strenous exercise 7 days   Average minutes spent exercising at this level 30 min            7/23/2024   Social Factors   Frequency of gathering with friends or relatives Twice a week   Worry food won't last until get money to buy more No   Food not last or not have enough money for food? No   Do you have housing? (Housing is defined as stable permanent housing and does not include staying ouside in a car, in a tent, in an abandoned building, in an overnight shelter, or couch-surfing.) Yes   Are you worried about losing your housing? No   Lack of transportation? No   Unable to get utilities (heat,electricity)? No            7/23/2024   Fall Risk   Fallen 2 or more times in the past year? No    No   Trouble with walking or balance? No    No       Multiple values from one day are sorted in reverse-chronological order          7/23/2024   Activities of Daily Living- Home Safety   Needs help with the following daily activites None of the above   Safety concerns in the home None of the above            7/23/2024   Dental   Dentist two times every year? Yes            7/23/2024   Hearing Screening   Hearing concerns? (!) I NEED TO ASK PEOPLE TO SPEAK UP OR REPEAT THEMSELVES.    (!) IT'S HARDER TO UNDERSTAND WOMEN'S VOICES THAN MEN'S VOICES.    (!) IT'S HARD TO FOLLOW A CONVERSATION IN A NOISY RESTAURANT OR CROWDED ROOM.    (!) TROUBLE UNDESTANDING A SPEAKER IN A PUBLIC MEETING OR [employer] SERVICE.    (!) TROUBLE FOLLOWING DIALOGUE IN THE THEATHER.    (!) TROUBLE UNDERSTANDING SOFT OR WHISPERED SPEECH.    (!) TROUBLE UNDERSTANDING SPEECH ON THE TELEPHONE       Multiple values from one day are sorted in reverse-chronological order         7/23/2024   Driving Risk Screening   Patient/family members have concerns about  driving No            2024   General Alertness/Fatigue Screening   Have you been more tired than usual lately? (!) YES            2024   Urinary Incontinence Screening   Bothered by leaking urine in past 6 months No            2024   TB Screening   Were you born outside of the [place]? No            Today's PHQ-2 Score:       2024    11:09 AM   PHQ-2 (  [employer])   Q1: Little interest or pleasure in doing things 0   Q2: Feeling down, depressed or hopeless 0   PHQ-2 Score 0   Q1: Little interest or pleasure in doing things Not at all   Q2: Feeling down, depressed or hopeless Not at all   PHQ-2 Score 0           2024   Substance Use   Alcohol more than 3/day or more than 7/wk No   Do you have a current opioid prescription? No   How severe/bad is pain from 1 to 10? 0/10 (No Pain)   Do you use any other substances recreationally? No        Social History     Tobacco Use     Smoking status: Former     Current packs/day: 0.00     Average packs/day: 1 pack/day for 18.0 years (18.0 ttl pk-yrs)     Types: Cigarettes, Cigars     Start date: 1963     Quit date: 1981     Years since quittin.5     Smokeless tobacco: Never     Tobacco comments:     Previos 1 ppd smoker   Vaping Use     Vaping status: Never Used   Substance Use Topics     Alcohol use: Yes     Comment: Daily coctail     Drug use: No       ASCVD Risk   The ASCVD Risk score (Raquel DK, et al., 2019) failed to calculate for the following reasons:    Cannot find a previous HDL lab    Cannot find a previous total cholesterol lab            Reviewed and updated as needed this visit by Provider                      Current providers sharing in care for this patient include:  Patient Care Team:  Remberto Peralta MD as PCP - General  Remberto Peralta MD as Assigned PCP  Russel Lizama RN as VAD Coordinator (Cardiology)    The following health maintenance items are reviewed in Epic and correct as of  today:  Health Maintenance   Topic Date Due     ANNUAL REVIEW OF HM ORDERS  Never done     ZOSTER IMMUNIZATION (2 of 3) 12/27/2012     GLUCOSE  02/06/2022     LIPID  02/06/2024     COVID-19 Vaccine (6 - 2023-24 season) 02/18/2024     MEDICARE ANNUAL WELLNESS VISIT  06/28/2024     INFLUENZA VACCINE (1) 09/01/2024     FALL RISK ASSESSMENT  07/24/2025     DTAP/TDAP/TD IMMUNIZATION (2 - Td or Tdap) 08/31/2026     COLORECTAL CANCER SCREENING  02/17/2027     ADVANCE CARE PLANNING  07/24/2029     HEPATITIS C SCREENING  Completed     PHQ-2 (once per calendar year)  Completed     Pneumococcal Vaccine: 65+ Years  Completed     RSV VACCINE (Pregnancy & 60+)  Completed     IPV IMMUNIZATION  Aged Out     HPV IMMUNIZATION  Aged Out     MENINGITIS IMMUNIZATION  Aged Out     RSV MONOCLONAL ANTIBODY  Aged Out     LUNG CANCER SCREENING  Discontinued         Review of Systems  Constitutional, HEENT, cardiovascular, pulmonary, gi and gu systems are negative, except as otherwise noted.     Objective    Exam  /70 (BP Location: Left arm, Patient Position: Sitting, Cuff Size: Adult Regular)   Pulse 60   Temp 97.9  F (36.6  C) (Temporal)   Resp 13   Ht 1.829 m (6')   Wt 86.4 kg (190 lb 6.4 oz)   SpO2 99%   BMI 25.82 kg/m     Estimated body mass index is 25.82 kg/m  as calculated from the following:    Height as of this encounter: 1.829 m (6').    Weight as of this encounter: 86.4 kg (190 lb 6.4 oz).    Physical Exam  GENERAL: alert and no distress  NECK: no adenopathy, no asymmetry, masses, or scars  RESP: lungs clear to auscultation - no rales, rhonchi or wheezes  CV: regular rate and rhythm, normal S1 S2, no S3 or S4, no murmur, click or rub, no peripheral edema  ABDOMEN: soft, nontender, no hepatosplenomegaly, no masses and bowel sounds normal  MS: no gross musculoskeletal defects noted, no edema         7/24/2024   Mini Cog   Clock Draw Score 2 Normal   3 Item Recall 2 objects recalled   Mini Cog Total Score 4                  Signed Electronically by: Remberto Peralta MD

## 2024-08-25 ENCOUNTER — HEALTH MAINTENANCE LETTER (OUTPATIENT)
Age: 79
End: 2024-08-25

## 2024-09-05 ENCOUNTER — MYC MEDICAL ADVICE (OUTPATIENT)
Dept: INTERNAL MEDICINE | Facility: CLINIC | Age: 79
End: 2024-09-05
Payer: COMMERCIAL

## 2024-09-05 DIAGNOSIS — L72.9 SUBCUTANEOUS CYST: Primary | ICD-10-CM

## 2024-09-05 NOTE — TELEPHONE ENCOUNTER
"Please see  message and advise.      Per chart review- OV note from 7/24/24- \"Discuss lump on right leg\". Writer is unable to determine what the plan is due to note being unsigned.   "

## 2024-10-06 DIAGNOSIS — F41.1 ANXIETY STATE: ICD-10-CM

## 2024-10-07 RX ORDER — MIRTAZAPINE 30 MG/1
60 TABLET, FILM COATED ORAL AT BEDTIME
Qty: 180 TABLET | Refills: 0 | Status: SHIPPED | OUTPATIENT
Start: 2024-10-07

## 2024-10-09 ENCOUNTER — OFFICE VISIT (OUTPATIENT)
Dept: SURGERY | Facility: CLINIC | Age: 79
End: 2024-10-09
Payer: COMMERCIAL

## 2024-10-09 VITALS
HEIGHT: 72 IN | OXYGEN SATURATION: 98 % | DIASTOLIC BLOOD PRESSURE: 72 MMHG | RESPIRATION RATE: 16 BRPM | SYSTOLIC BLOOD PRESSURE: 120 MMHG | BODY MASS INDEX: 24.92 KG/M2 | HEART RATE: 51 BPM | WEIGHT: 184 LBS

## 2024-10-09 DIAGNOSIS — L72.9 SUBCUTANEOUS CYST: ICD-10-CM

## 2024-10-09 PROCEDURE — 99203 OFFICE O/P NEW LOW 30 MIN: CPT | Performed by: SURGERY

## 2024-10-09 NOTE — LETTER
October 10, 2024          Remberto Peralta MD  [address] [address]      RE:   Russel Denny 1945      Dear Colleague,    Thank you for referring your patient, Russel Denny, to [facility] [place]. Please see a copy of my visit note below.    Met with Mr. Denny to discuss care over recently develop right leg cyst.  He believes it all started in January of this year.  He first had a dime size lump in that area of the lateral right leg, this became the size of a quarter and became red and painful.  He was given oral antibiotics and ointment in August with improvement of the symptoms.  Ultimately the cyst opened up and he drained some toothpaste like material.     On exam the skin in the area is showing stigmata of recent irritation but no evidence of cellulitis, there is no palpable masses nor cyst noted.     Spontaneously drain inclusion cyst.  I discussed with him the rationale for observation versus surgical excision.  At the moment given the good drainage and potential scarring down/resolution of this small cyst we will continue with observation.  He will return to see us if this opening area fails to completely heal or if the cyst were to recur.     He is comfortable with this plan.    Again, thank you for allowing me to participate in the care of your patient.      Sincerely,      Brennon Valdes MD

## 2024-10-09 NOTE — PROGRESS NOTES
General surgery clinic note    Met with Mr. Denny to discuss care over recently develop right leg cyst.  He believes it all started in January of this year.  He first had a dime size lump in that area of the lateral right leg, this became the size of a quarter and became red and painful.  He was given oral antibiotics and ointment in August with improvement of the symptoms.  Ultimately the cyst opened up and he drained some toothpaste like material.    On exam the skin in the area is showing stigmata of recent irritation but no evidence of cellulitis, there is no palpable masses nor cyst noted.    Spontaneously drain inclusion cyst.  I discussed with him the rationale for observation versus surgical excision.  At the moment given the good drainage and potential scarring down/resolution of this small cyst we will continue with observation.  He will return to see us if this opening area fails to completely heal or if the cyst were to recur.    He is comfortable with this plan.    Total encounter time 35 minutes, more than half spent in counseling, review of data, and coordination of care.

## 2024-10-25 ENCOUNTER — MYC MEDICAL ADVICE (OUTPATIENT)
Dept: INTERNAL MEDICINE | Facility: CLINIC | Age: 79
End: 2024-10-25
Payer: COMMERCIAL

## 2024-10-29 NOTE — TELEPHONE ENCOUNTER
Per chart review, patient was evaluated 10/26 at [facility] for Covid-19, received treatment. Closing encounter.

## 2025-01-04 DIAGNOSIS — F41.1 ANXIETY STATE: ICD-10-CM

## 2025-01-06 RX ORDER — MIRTAZAPINE 30 MG/1
60 TABLET, FILM COATED ORAL AT BEDTIME
Qty: 180 TABLET | Refills: 0 | Status: SHIPPED | OUTPATIENT
Start: 2025-01-06

## 2025-04-12 DIAGNOSIS — F41.1 ANXIETY STATE: ICD-10-CM

## 2025-04-14 RX ORDER — MIRTAZAPINE 30 MG/1
60 TABLET, FILM COATED ORAL AT BEDTIME
Qty: 180 TABLET | Refills: 0 | Status: SHIPPED | OUTPATIENT
Start: 2025-04-14

## 2025-06-30 ENCOUNTER — PATIENT OUTREACH (OUTPATIENT)
Dept: CARE COORDINATION | Facility: CLINIC | Age: 80
End: 2025-06-30
Payer: COMMERCIAL

## 2025-07-12 DIAGNOSIS — F41.1 ANXIETY STATE: ICD-10-CM

## 2025-07-14 ENCOUNTER — PATIENT OUTREACH (OUTPATIENT)
Dept: CARE COORDINATION | Facility: CLINIC | Age: 80
End: 2025-07-14
Payer: COMMERCIAL

## 2025-07-14 RX ORDER — MIRTAZAPINE 30 MG/1
60 TABLET, FILM COATED ORAL AT BEDTIME
Qty: 180 TABLET | Refills: 0 | Status: SHIPPED | OUTPATIENT
Start: 2025-07-14